# Patient Record
Sex: FEMALE | Race: ASIAN | NOT HISPANIC OR LATINO | Employment: UNEMPLOYED | URBAN - METROPOLITAN AREA
[De-identification: names, ages, dates, MRNs, and addresses within clinical notes are randomized per-mention and may not be internally consistent; named-entity substitution may affect disease eponyms.]

---

## 2021-05-13 ENCOUNTER — HISTORICAL (OUTPATIENT)
Dept: ADMINISTRATIVE | Facility: HOSPITAL | Age: 26
End: 2021-05-13

## 2021-05-13 LAB
ABS NEUT (OLG): 7.85 X10(3)/MCL (ref 2.1–9.2)
APPEARANCE, UA: CLEAR
BACTERIA #/AREA URNS AUTO: ABNORMAL /HPF
BASOPHILS # BLD AUTO: 0 X10(3)/MCL (ref 0–0.2)
BASOPHILS NFR BLD AUTO: 0 %
BILIRUB UR QL STRIP: NEGATIVE
BUN SERPL-MCNC: 5.3 MG/DL (ref 7–18.7)
CALCIUM SERPL-MCNC: 10.1 MG/DL (ref 8.4–10.2)
CHLORIDE SERPL-SCNC: 107 MMOL/L (ref 98–107)
CO2 SERPL-SCNC: 21 MMOL/L (ref 22–29)
COLOR UR: ABNORMAL
CREAT SERPL-MCNC: 0.54 MG/DL (ref 0.55–1.02)
CREAT/UREA NIT SERPL: 10
EOSINOPHIL # BLD AUTO: 0.1 X10(3)/MCL (ref 0–0.9)
EOSINOPHIL NFR BLD AUTO: 1 %
ERYTHROCYTE [DISTWIDTH] IN BLOOD BY AUTOMATED COUNT: 11.8 % (ref 11.5–14.5)
GLUCOSE (UA): NEGATIVE
GLUCOSE SERPL-MCNC: 75 MG/DL (ref 74–100)
HBV SURFACE AG SERPL QL IA: NONREACTIVE
HCT VFR BLD AUTO: 38 % (ref 35–46)
HCV AB SERPL QL IA: NONREACTIVE
HGB BLD-MCNC: 12.9 GM/DL (ref 12–16)
HGB UR QL STRIP: NEGATIVE
HIV 1+2 AB+HIV1 P24 AG SERPL QL IA: NONREACTIVE
HYALINE CASTS #/AREA URNS LPF: ABNORMAL /LPF
IMM GRANULOCYTES # BLD AUTO: 0.03 10*3/UL
IMM GRANULOCYTES NFR BLD AUTO: 0 %
KETONES UR QL STRIP: NEGATIVE
LEUKOCYTE ESTERASE UR QL STRIP: NEGATIVE
LYMPHOCYTES # BLD AUTO: 2.2 X10(3)/MCL (ref 0.6–4.6)
LYMPHOCYTES NFR BLD AUTO: 21 %
MCH RBC QN AUTO: 30.9 PG (ref 26–34)
MCHC RBC AUTO-ENTMCNC: 33.9 GM/DL (ref 31–37)
MCV RBC AUTO: 91.1 FL (ref 80–100)
MONOCYTES # BLD AUTO: 0.5 X10(3)/MCL (ref 0.1–1.3)
MONOCYTES NFR BLD AUTO: 4 %
MUCOUS THREADS URNS QL MICRO: SLIGHT
NEUTROPHILS # BLD AUTO: 7.85 X10(3)/MCL (ref 2.1–9.2)
NEUTROPHILS NFR BLD AUTO: 74 %
NITRITE UR QL STRIP: NEGATIVE
PH UR STRIP: 7 [PH] (ref 4.5–8)
PLATELET # BLD AUTO: 237 X10(3)/MCL (ref 130–400)
PMV BLD AUTO: 9.9 FL (ref 7.4–10.4)
POTASSIUM SERPL-SCNC: 3.7 MMOL/L (ref 3.5–5.1)
PROT UR QL STRIP: NEGATIVE
RBC # BLD AUTO: 4.17 X10(6)/MCL (ref 4–5.2)
RBC #/AREA URNS AUTO: ABNORMAL /HPF
SODIUM SERPL-SCNC: 139 MMOL/L (ref 136–145)
SP GR UR STRIP: 1 (ref 1–1.03)
SQUAMOUS #/AREA URNS LPF: ABNORMAL /LPF
T PALLIDUM AB SER QL: NONREACTIVE
UROBILINOGEN UR STRIP-ACNC: NORMAL
WBC # SPEC AUTO: 10.6 X10(3)/MCL (ref 4.5–11)
WBC #/AREA URNS AUTO: ABNORMAL /HPF

## 2021-05-16 LAB — FINAL CULTURE: NORMAL

## 2021-06-09 ENCOUNTER — HISTORICAL (OUTPATIENT)
Dept: ADMINISTRATIVE | Facility: HOSPITAL | Age: 26
End: 2021-06-09

## 2021-06-09 LAB
APPEARANCE, UA: ABNORMAL
BACTERIA #/AREA URNS AUTO: ABNORMAL /HPF
BILIRUB SERPL-MCNC: NEGATIVE MG/DL
BILIRUB UR QL STRIP: NEGATIVE
BLOOD URINE, POC: NEGATIVE
CLARITY, POC UA: CLEAR
COLOR UR: ABNORMAL
COLOR, POC UA: YELLOW
GLUCOSE (UA): 70 MG/DL
GLUCOSE UR QL STRIP: NORMAL
HGB UR QL STRIP: NEGATIVE
HYALINE CASTS #/AREA URNS LPF: ABNORMAL /LPF
KETONES UR QL STRIP: NEGATIVE
KETONES UR QL STRIP: NEGATIVE
LEUKOCYTE EST, POC UA: NORMAL
LEUKOCYTE ESTERASE UR QL STRIP: 500 LEU/UL
NITRITE UR QL STRIP: NEGATIVE
NITRITE, POC UA: NEGATIVE
PH UR STRIP: 7.5 [PH] (ref 4.5–8)
PH, POC UA: 7.5
PROT UR QL STRIP: NEGATIVE
PROTEIN, POC: NEGATIVE
RBC #/AREA URNS AUTO: ABNORMAL /HPF
SP GR UR STRIP: 1 (ref 1–1.03)
SPECIFIC GRAVITY, POC UA: 1.02
SQUAMOUS #/AREA URNS LPF: >100 /LPF
UROBILINOGEN UR STRIP-ACNC: NORMAL
UROBILINOGEN, POC UA: NORMAL
WBC #/AREA URNS AUTO: ABNORMAL /HPF

## 2021-06-11 LAB — FINAL CULTURE: NORMAL

## 2021-06-29 LAB
BILIRUB SERPL-MCNC: NEGATIVE MG/DL
BLOOD URINE, POC: NEGATIVE
CLARITY, POC UA: CLEAR
COLOR, POC UA: YELLOW
GLUCOSE UR QL STRIP: NEGATIVE
KETONES UR QL STRIP: NEGATIVE
LEUKOCYTE EST, POC UA: NEGATIVE
NITRITE, POC UA: NEGATIVE
PH, POC UA: 7
PROTEIN, POC: NEGATIVE
SPECIFIC GRAVITY, POC UA: 1.01
UROBILINOGEN, POC UA: NORMAL

## 2021-07-26 LAB
BILIRUB SERPL-MCNC: NEGATIVE MG/DL
BLOOD URINE, POC: NEGATIVE
CLARITY, POC UA: CLEAR
COLOR, POC UA: YELLOW
GLUCOSE UR QL STRIP: NORMAL
KETONES UR QL STRIP: NEGATIVE
LEUKOCYTE EST, POC UA: NEGATIVE
NITRITE, POC UA: NEGATIVE
PH, POC UA: 7
PROTEIN, POC: NEGATIVE
SPECIFIC GRAVITY, POC UA: 1.02
UROBILINOGEN, POC UA: NORMAL

## 2021-09-01 ENCOUNTER — HISTORICAL (OUTPATIENT)
Dept: ADMINISTRATIVE | Facility: HOSPITAL | Age: 26
End: 2021-09-01

## 2021-09-01 LAB
BILIRUB SERPL-MCNC: NEGATIVE MG/DL
BLOOD URINE, POC: NEGATIVE
CLARITY, POC UA: CLEAR
COLOR, POC UA: YELLOW
GLUCOSE 1H P 100 G GLC PO SERPL-MCNC: 182 MG/DL (ref 100–180)
GLUCOSE UR QL STRIP: NEGATIVE
HCT VFR BLD AUTO: 30.7 % (ref 35–46)
HGB BLD-MCNC: 10.8 GM/DL (ref 12–16)
KETONES UR QL STRIP: NEGATIVE
LEUKOCYTE EST, POC UA: NEGATIVE
NITRITE, POC UA: NEGATIVE
PH, POC UA: 7
PROTEIN, POC: NEGATIVE
SPECIFIC GRAVITY, POC UA: 1.02
T PALLIDUM AB SER QL: NONREACTIVE
UROBILINOGEN, POC UA: NORMAL

## 2021-09-07 ENCOUNTER — HISTORICAL (OUTPATIENT)
Dept: ADMINISTRATIVE | Facility: HOSPITAL | Age: 26
End: 2021-09-07

## 2021-09-07 LAB
GLUCOSE 1H P 100 G GLC PO SERPL-MCNC: 235 MG/DL (ref 100–180)
GLUCOSE 2H P 100 G GLC PO SERPL-MCNC: 198 MG/DL (ref 70–140)
GLUCOSE 3H P 100 G GLC PO SERPL-MCNC: 145 MG/DL (ref 70–115)
GLUCOSE P FAST SERPL-MCNC: 81 MG/DL (ref 70–115)

## 2021-09-14 LAB
BILIRUB SERPL-MCNC: NEGATIVE MG/DL
BLOOD URINE, POC: NORMAL
CLARITY, POC UA: CLEAR
COLOR, POC UA: YELLOW
GLUCOSE UR QL STRIP: NEGATIVE
KETONES UR QL STRIP: NEGATIVE
LEUKOCYTE EST, POC UA: NORMAL
NITRITE, POC UA: NEGATIVE
PH, POC UA: 7
PROTEIN, POC: NEGATIVE
SPECIFIC GRAVITY, POC UA: 1.02
UROBILINOGEN, POC UA: NORMAL

## 2021-09-21 LAB
BILIRUB SERPL-MCNC: NEGATIVE MG/DL
BLOOD URINE, POC: NEGATIVE
CLARITY, POC UA: CLEAR
COLOR, POC UA: YELLOW
GLUCOSE UR QL STRIP: NEGATIVE
KETONES UR QL STRIP: NEGATIVE
LEUKOCYTE EST, POC UA: NORMAL
NITRITE, POC UA: NEGATIVE
PH, POC UA: 7
PROTEIN, POC: NEGATIVE
SPECIFIC GRAVITY, POC UA: 1.02
UROBILINOGEN, POC UA: NORMAL

## 2021-10-12 LAB
BILIRUB SERPL-MCNC: NEGATIVE MG/DL
BLOOD URINE, POC: NEGATIVE
CLARITY, POC UA: CLEAR
COLOR, POC UA: YELLOW
GLUCOSE UR QL STRIP: NEGATIVE
KETONES UR QL STRIP: NEGATIVE
LEUKOCYTE EST, POC UA: NORMAL
NITRITE, POC UA: NEGATIVE
PH, POC UA: 7.5
PROTEIN, POC: NEGATIVE
SPECIFIC GRAVITY, POC UA: 1.01
UROBILINOGEN, POC UA: NORMAL

## 2021-10-26 LAB
BILIRUB SERPL-MCNC: NEGATIVE MG/DL
BLOOD URINE, POC: NEGATIVE
CLARITY, POC UA: CLEAR
COLOR, POC UA: YELLOW
GLUCOSE UR QL STRIP: NEGATIVE
KETONES UR QL STRIP: NEGATIVE
LEUKOCYTE EST, POC UA: NEGATIVE
NITRITE, POC UA: NEGATIVE
PH, POC UA: 7
PROTEIN, POC: NEGATIVE
SPECIFIC GRAVITY, POC UA: 1.02
UROBILINOGEN, POC UA: NORMAL

## 2021-11-04 ENCOUNTER — HISTORICAL (OUTPATIENT)
Dept: ADMINISTRATIVE | Facility: HOSPITAL | Age: 26
End: 2021-11-04

## 2021-11-04 LAB
ABS NEUT (OLG): 4.87 X10(3)/MCL (ref 2.1–9.2)
BASOPHILS # BLD AUTO: 0 X10(3)/MCL (ref 0–0.2)
BASOPHILS NFR BLD AUTO: 0 %
BILIRUB SERPL-MCNC: NEGATIVE MG/DL
BLOOD URINE, POC: NEGATIVE
CLARITY, POC UA: CLEAR
COLOR, POC UA: YELLOW
EOSINOPHIL # BLD AUTO: 0 X10(3)/MCL (ref 0–0.9)
EOSINOPHIL NFR BLD AUTO: 1 %
ERYTHROCYTE [DISTWIDTH] IN BLOOD BY AUTOMATED COUNT: 13.5 % (ref 11.5–14.5)
GLUCOSE UR QL STRIP: NEGATIVE
HCT VFR BLD AUTO: 33.8 % (ref 35–46)
HGB BLD-MCNC: 11.5 GM/DL (ref 12–16)
HIV 1+2 AB+HIV1 P24 AG SERPL QL IA: NONREACTIVE
IMM GRANULOCYTES # BLD AUTO: 0.1 10*3/UL
IMM GRANULOCYTES NFR BLD AUTO: 1 %
KETONES UR QL STRIP: NEGATIVE
LEUKOCYTE EST, POC UA: NEGATIVE
LYMPHOCYTES # BLD AUTO: 2.1 X10(3)/MCL (ref 0.6–4.6)
LYMPHOCYTES NFR BLD AUTO: 28 %
MCH RBC QN AUTO: 32.8 PG (ref 26–34)
MCHC RBC AUTO-ENTMCNC: 34 GM/DL (ref 31–37)
MCV RBC AUTO: 96.3 FL (ref 80–100)
MONOCYTES # BLD AUTO: 0.4 X10(3)/MCL (ref 0.1–1.3)
MONOCYTES NFR BLD AUTO: 6 %
NEUTROPHILS # BLD AUTO: 4.87 X10(3)/MCL (ref 2.1–9.2)
NEUTROPHILS NFR BLD AUTO: 64 %
NITRITE, POC UA: NEGATIVE
NRBC BLD AUTO-RTO: 0 % (ref 0–0.2)
PH, POC UA: 7
PLATELET # BLD AUTO: 185 X10(3)/MCL (ref 130–400)
PMV BLD AUTO: 9.6 FL (ref 7.4–10.4)
PROTEIN, POC: NEGATIVE
RBC # BLD AUTO: 3.51 X10(6)/MCL (ref 4–5.2)
SPECIFIC GRAVITY, POC UA: 1.01
T PALLIDUM AB SER QL: NONREACTIVE
UROBILINOGEN, POC UA: NORMAL
WBC # SPEC AUTO: 7.6 X10(3)/MCL (ref 4.5–11)

## 2021-11-11 LAB
BILIRUB SERPL-MCNC: NEGATIVE MG/DL
BLOOD URINE, POC: NEGATIVE
CLARITY, POC UA: CLEAR
COLOR, POC UA: NORMAL
GLUCOSE UR QL STRIP: NEGATIVE
KETONES UR QL STRIP: NEGATIVE
LEUKOCYTE EST, POC UA: NEGATIVE
NITRITE, POC UA: NEGATIVE
PH, POC UA: 7
PROTEIN, POC: NEGATIVE
SPECIFIC GRAVITY, POC UA: 1.01
UROBILINOGEN, POC UA: NORMAL

## 2022-01-06 LAB
HUMAN PAPILLOMAVIRUS (HPV): NORMAL
PAP RECOMMENDATION EXT: ABNORMAL
PAP SMEAR: ABNORMAL

## 2022-01-07 ENCOUNTER — HISTORICAL (OUTPATIENT)
Dept: FAMILY MEDICINE | Facility: CLINIC | Age: 27
End: 2022-01-07

## 2022-01-07 LAB
GLUCOSE 1H P 100 G GLC PO SERPL-MCNC: 149 MG/DL (ref 100–180)
GLUCOSE 2H P 100 G GLC PO SERPL-MCNC: 85 MG/DL (ref 70–140)
GLUCOSE P FAST SERPL-MCNC: 95 MG/DL (ref 70–115)

## 2022-04-07 ENCOUNTER — HISTORICAL (OUTPATIENT)
Dept: ADMINISTRATIVE | Facility: HOSPITAL | Age: 27
End: 2022-04-07

## 2022-04-24 VITALS
BODY MASS INDEX: 18.86 KG/M2 | OXYGEN SATURATION: 98 % | WEIGHT: 99.88 LBS | DIASTOLIC BLOOD PRESSURE: 78 MMHG | SYSTOLIC BLOOD PRESSURE: 111 MMHG | HEIGHT: 61 IN

## 2022-09-22 ENCOUNTER — HISTORICAL (OUTPATIENT)
Dept: ADMINISTRATIVE | Facility: HOSPITAL | Age: 27
End: 2022-09-22

## 2023-04-26 ENCOUNTER — DOCUMENTATION ONLY (OUTPATIENT)
Dept: ADMINISTRATIVE | Facility: HOSPITAL | Age: 28
End: 2023-04-26

## 2023-06-27 ENCOUNTER — OFFICE VISIT (OUTPATIENT)
Dept: FAMILY MEDICINE | Facility: CLINIC | Age: 28
End: 2023-06-27
Payer: MEDICAID

## 2023-06-27 VITALS
RESPIRATION RATE: 17 BRPM | BODY MASS INDEX: 18.5 KG/M2 | OXYGEN SATURATION: 100 % | SYSTOLIC BLOOD PRESSURE: 110 MMHG | HEIGHT: 61 IN | TEMPERATURE: 98 F | WEIGHT: 98 LBS | DIASTOLIC BLOOD PRESSURE: 68 MMHG | HEART RATE: 66 BPM

## 2023-06-27 DIAGNOSIS — Z34.90 PREGNANCY, UNSPECIFIED GESTATIONAL AGE: Primary | ICD-10-CM

## 2023-06-27 DIAGNOSIS — K21.9 GASTROESOPHAGEAL REFLUX DISEASE, UNSPECIFIED WHETHER ESOPHAGITIS PRESENT: ICD-10-CM

## 2023-06-27 PROBLEM — R87.612 LOW GRADE SQUAMOUS INTRAEPITHELIAL LESION (LGSIL) ON PAPANICOLAOU SMEAR OF CERVIX: Status: ACTIVE | Noted: 2023-06-27

## 2023-06-27 PROBLEM — Z78.9 NOT IMMUNE TO RUBELLA: Status: ACTIVE | Noted: 2023-06-27

## 2023-06-27 PROBLEM — O24.419 GESTATIONAL DIABETES MELLITUS (GDM): Status: ACTIVE | Noted: 2023-06-27

## 2023-06-27 PROCEDURE — 99214 OFFICE O/P EST MOD 30 MIN: CPT | Mod: PBBFAC

## 2023-06-27 RX ORDER — DOXYLAMINE SUCCINATE AND PYRIDOXINE HYDROCHLORIDE, DELAYED RELEASE TABLETS 10 MG/10 MG 10; 10 MG/1; MG/1
2 TABLET, DELAYED RELEASE ORAL NIGHTLY
Qty: 60 TABLET | Refills: 2 | Status: SHIPPED | OUTPATIENT
Start: 2023-06-27 | End: 2023-06-27

## 2023-06-27 RX ORDER — CALC/MAG/B COMPLEX/D3/HERB 61
15 TABLET ORAL DAILY
Qty: 30 CAPSULE | Refills: 11 | Status: SHIPPED | OUTPATIENT
Start: 2023-06-27 | End: 2023-08-03 | Stop reason: SDUPTHER

## 2023-06-27 NOTE — PROGRESS NOTES
"[facility] Walk In Progress Note    ID:  Bennie Escamilla   MRN:  [id number]     2023    Chief Complaint:      History of Present Illness:  Bennie Escamilla is a 28 y.o.  who presents to [facility] clinic for verify pregnancy, lmp 23 with pos home pregnancy test.    G1: 2021- vaginal 39.6 wga- GDM  G2: Current    Menstrual Hx:  Onset 13, 28 days, 3-5 pads, 3-5 days    Acute Issues:  "Stomach burning"- intermittent 2 weeks upper abdomen worse with lying flat or spicy foods. Not taking any meds.     Chronic Issues:  Hx of LSIL on PAP  Gestational DM- diet controlled      Health Maintenance   Topic Date Due    Lipid Panel  Never done    TETANUS VACCINE  Never done    Pap Smear  2025    Hepatitis C Screening  Completed       No past medical history on file.    No past surgical history on file.    Social History     Tobacco Use    Smoking status: Never    Smokeless tobacco: Never       No family history on file.      Current Outpatient Medications:     doxylamine-pyridoxine, vit B6, (DICLEGIS) 10-10 mg TbEC, Take 2 tablets by mouth every evening., Disp: 60 tablet, Rfl: 2    Review of patient's allergies indicates:  No Known Allergies      Review of Systems:  See HPI      Physical Exam:  /68 (BP Location: Right arm, Patient Position: Sitting, BP Method: Medium (Automatic))   Pulse 66   Temp 97.7 °F (36.5 °C) (Oral)   Resp 17   Ht 5' 1.42" (1.56 m)   Wt 44.5 kg (98 lb)   LMP 2023 (Exact Date)   SpO2 100%   BMI 18.27 kg/m²     General: in no acute distress  RESP: clear to auscultation bilaterally, non labored  CV: regular rate and rhythm, no murmurs, no edema  ABD: gravid, nontender, BS+        Assessment/Plan:    Pregnancy, unspecified gestational age  Gastroesophageal reflux disease, unspecified whether esophagitis present      Plan:  - UPT positive  - OB Protocol, PNVs to pharmacy  - Prevacid for GERD, discussed dietary modifications  - Postpartum contraception discussion: pending  - Labor " precautions discussed  - Return to clinic with initial OB clinic for initial labs      Kee Trimble MD  [employer] FM Resident HO[facility]

## 2023-06-28 NOTE — PROGRESS NOTES
Faculty Attestation: Bennie Escamilla  was seen in Family Medicine Clinic. Patient chart reviewed. History of Present Illness, Physical Exam, and Assessment and Plan reviewed. Treatment plan is reasonable and appropriate. Compliance with treatment recommendations is important.      Phi Johnston MD

## 2023-07-02 ENCOUNTER — HOSPITAL ENCOUNTER (EMERGENCY)
Facility: HOSPITAL | Age: 28
Discharge: HOME | End: 2023-07-02
Attending: EMERGENCY MEDICINE
Payer: MEDICAID

## 2023-07-02 VITALS
HEIGHT: 61 IN | HEART RATE: 78 BPM | RESPIRATION RATE: 18 BRPM | SYSTOLIC BLOOD PRESSURE: 103 MMHG | TEMPERATURE: 99 F | WEIGHT: 98 LBS | DIASTOLIC BLOOD PRESSURE: 75 MMHG | OXYGEN SATURATION: 100 % | BODY MASS INDEX: 18.5 KG/M2

## 2023-07-02 DIAGNOSIS — Z34.91 NORMAL INTRAUTERINE PREGNANCY ON PRENATAL ULTRASOUND IN FIRST TRIMESTER: Primary | ICD-10-CM

## 2023-07-02 DIAGNOSIS — O20.9 VAGINAL BLEEDING AFFECTING EARLY PREGNANCY: ICD-10-CM

## 2023-07-02 LAB
AMORPH URATE CRY URNS QL MICRO: ABNORMAL /HPF
APPEARANCE UR: ABNORMAL
B-HCG FREE SERPL-ACNC: ABNORMAL MIU/ML
B-HCG SERPL QL: POSITIVE
BACTERIA #/AREA URNS AUTO: ABNORMAL /HPF
BILIRUB UR QL STRIP.AUTO: NEGATIVE MG/DL
C TRACH DNA SPEC QL NAA+PROBE: NOT DETECTED
COLOR UR: YELLOW
GLUCOSE UR QL STRIP.AUTO: NEGATIVE MG/DL
KETONES UR QL STRIP.AUTO: NEGATIVE MG/DL
LEUKOCYTE ESTERASE UR QL STRIP.AUTO: ABNORMAL UNIT/L
N GONORRHOEA DNA SPEC QL NAA+PROBE: NOT DETECTED
NITRITE UR QL STRIP.AUTO: NEGATIVE
PH UR STRIP.AUTO: 7 [PH]
PROT UR QL STRIP.AUTO: ABNORMAL MG/DL
RBC #/AREA URNS AUTO: ABNORMAL /HPF
RBC UR QL AUTO: ABNORMAL UNIT/L
SP GR UR STRIP.AUTO: 1.01
SQUAMOUS #/AREA URNS AUTO: ABNORMAL /HPF
UROBILINOGEN UR STRIP-ACNC: 0.2 MG/DL
WBC #/AREA URNS AUTO: ABNORMAL /HPF

## 2023-07-02 PROCEDURE — 99284 EMERGENCY DEPT VISIT MOD MDM: CPT | Mod: 25

## 2023-07-02 PROCEDURE — 84702 CHORIONIC GONADOTROPIN TEST: CPT | Performed by: EMERGENCY MEDICINE

## 2023-07-02 PROCEDURE — 81001 URINALYSIS AUTO W/SCOPE: CPT | Performed by: EMERGENCY MEDICINE

## 2023-07-02 PROCEDURE — 87088 URINE BACTERIA CULTURE: CPT | Performed by: EMERGENCY MEDICINE

## 2023-07-02 PROCEDURE — 81025 URINE PREGNANCY TEST: CPT | Performed by: EMERGENCY MEDICINE

## 2023-07-02 PROCEDURE — 87591 N.GONORRHOEAE DNA AMP PROB: CPT | Performed by: EMERGENCY MEDICINE

## 2023-07-02 NOTE — ED TRIAGE NOTES
Pt relates that she is pregnant approx 2 months with lmp 4/26/23. Pt complaint of a dark color vaginal discharge this morning.  Pt denies pain. Pt has no OB_GYN MD

## 2023-07-04 LAB — BACTERIA UR CULT: ABNORMAL

## 2023-08-03 ENCOUNTER — HOSPITAL ENCOUNTER (OUTPATIENT)
Dept: RADIOLOGY | Facility: HOSPITAL | Age: 28
Discharge: HOME | End: 2023-08-03
Attending: OBSTETRICS & GYNECOLOGY | Admitting: OBSTETRICS & GYNECOLOGY
Payer: MEDICAID

## 2023-08-03 ENCOUNTER — OFFICE VISIT (OUTPATIENT)
Dept: FAMILY MEDICINE | Facility: CLINIC | Age: 28
End: 2023-08-03
Payer: MEDICAID

## 2023-08-03 VITALS
SYSTOLIC BLOOD PRESSURE: 92 MMHG | BODY MASS INDEX: 18.2 KG/M2 | WEIGHT: 96.38 LBS | DIASTOLIC BLOOD PRESSURE: 60 MMHG | RESPIRATION RATE: 20 BRPM | HEART RATE: 77 BPM | OXYGEN SATURATION: 99 % | TEMPERATURE: 99 F | HEIGHT: 61 IN

## 2023-08-03 DIAGNOSIS — O21.9 NAUSEA/VOMITING IN PREGNANCY: ICD-10-CM

## 2023-08-03 DIAGNOSIS — Z3A.13 13 WEEKS GESTATION OF PREGNANCY: ICD-10-CM

## 2023-08-03 DIAGNOSIS — Z34.90 PREGNANCY: ICD-10-CM

## 2023-08-03 DIAGNOSIS — K21.9 GASTROESOPHAGEAL REFLUX DISEASE, UNSPECIFIED WHETHER ESOPHAGITIS PRESENT: ICD-10-CM

## 2023-08-03 DIAGNOSIS — Z34.90 PREGNANCY, UNSPECIFIED GESTATIONAL AGE: ICD-10-CM

## 2023-08-03 DIAGNOSIS — Z3A.12 12 WEEKS GESTATION OF PREGNANCY: Primary | ICD-10-CM

## 2023-08-03 LAB
APPEARANCE UR: ABNORMAL
BACTERIA #/AREA URNS AUTO: ABNORMAL /HPF
BASOPHILS # BLD AUTO: 0.04 X10(3)/MCL
BASOPHILS NFR BLD AUTO: 0.4 %
BILIRUB SERPL-MCNC: NORMAL MG/DL
BILIRUB UR QL STRIP.AUTO: NEGATIVE
BLOOD URINE, POC: NORMAL
C TRACH DNA SPEC QL NAA+PROBE: NOT DETECTED
CLARITY, POC UA: CLEAR
COLOR UR: YELLOW
COLOR, POC UA: YELLOW
EOSINOPHIL # BLD AUTO: 0.07 X10(3)/MCL (ref 0–0.9)
EOSINOPHIL NFR BLD AUTO: 0.7 %
ERYTHROCYTE [DISTWIDTH] IN BLOOD BY AUTOMATED COUNT: 11.9 % (ref 11.5–17)
GLUCOSE UR QL STRIP.AUTO: NORMAL
GLUCOSE UR QL STRIP: NORMAL
GROUP & RH: NORMAL
HBV SURFACE AG SERPL QL IA: NONREACTIVE
HCT VFR BLD AUTO: 37.2 % (ref 37–47)
HCV AB SERPL QL IA: NONREACTIVE
HGB BLD-MCNC: 12.7 G/DL (ref 12–16)
HIV 1+2 AB+HIV1 P24 AG SERPL QL IA: NONREACTIVE
HYALINE CASTS #/AREA URNS LPF: ABNORMAL /LPF
IMM GRANULOCYTES # BLD AUTO: 0.04 X10(3)/MCL (ref 0–0.04)
IMM GRANULOCYTES NFR BLD AUTO: 0.4 %
INDIRECT COOMBS GEL: NORMAL
KETONES UR QL STRIP.AUTO: NEGATIVE
KETONES UR QL STRIP: NORMAL
LEUKOCYTE ESTERASE UR QL STRIP.AUTO: 25
LEUKOCYTE ESTERASE URINE, POC: NORMAL
LYMPHOCYTES # BLD AUTO: 2.58 X10(3)/MCL (ref 0.6–4.6)
LYMPHOCYTES NFR BLD AUTO: 25.7 %
MCH RBC QN AUTO: 30.8 PG (ref 27–31)
MCHC RBC AUTO-ENTMCNC: 34.1 G/DL (ref 33–36)
MCV RBC AUTO: 90.3 FL (ref 80–94)
MONOCYTES # BLD AUTO: 0.38 X10(3)/MCL (ref 0.1–1.3)
MONOCYTES NFR BLD AUTO: 3.8 %
N GONORRHOEA DNA SPEC QL NAA+PROBE: NOT DETECTED
NEUTROPHILS # BLD AUTO: 6.92 X10(3)/MCL (ref 2.1–9.2)
NEUTROPHILS NFR BLD AUTO: 69 %
NITRITE UR QL STRIP.AUTO: NEGATIVE
NITRITE, POC UA: NORMAL
NRBC BLD AUTO-RTO: 0 %
PH UR STRIP.AUTO: 7.5 [PH]
PH, POC UA: 7.5
PLATELET # BLD AUTO: 228 X10(3)/MCL (ref 130–400)
PMV BLD AUTO: 9.3 FL (ref 7.4–10.4)
PROT UR QL STRIP.AUTO: ABNORMAL
PROTEIN, POC: NORMAL
RBC # BLD AUTO: 4.12 X10(6)/MCL (ref 4.2–5.4)
RBC #/AREA URNS AUTO: ABNORMAL /HPF
RBC UR QL AUTO: NEGATIVE
SOURCE (OHS): NORMAL
SP GR UR STRIP.AUTO: 1.02
SPECIFIC GRAVITY, POC UA: 1.02
SPECIMEN OUTDATE: NORMAL
SQUAMOUS #/AREA URNS LPF: ABNORMAL /HPF
T PALLIDUM AB SER QL: NONREACTIVE
UROBILINOGEN UR STRIP-ACNC: NORMAL
UROBILINOGEN, POC UA: 0.2
WBC # SPEC AUTO: 10.03 X10(3)/MCL (ref 4.5–11.5)
WBC #/AREA URNS AUTO: ABNORMAL /HPF
YEAST BUDDING URNS QL: ABNORMAL /HPF

## 2023-08-03 PROCEDURE — 85660 RBC SICKLE CELL TEST: CPT

## 2023-08-03 PROCEDURE — 81001 URINALYSIS AUTO W/SCOPE: CPT

## 2023-08-03 PROCEDURE — 88174 CYTOPATH C/V AUTO IN FLUID: CPT

## 2023-08-03 PROCEDURE — 81002 URINALYSIS NONAUTO W/O SCOPE: CPT | Mod: PBBFAC

## 2023-08-03 PROCEDURE — 99214 OFFICE O/P EST MOD 30 MIN: CPT | Mod: PBBFAC,25

## 2023-08-03 PROCEDURE — 76801 OB US < 14 WKS SINGLE FETUS: CPT | Mod: TC

## 2023-08-03 PROCEDURE — 86803 HEPATITIS C AB TEST: CPT

## 2023-08-03 PROCEDURE — 87088 URINE BACTERIA CULTURE: CPT

## 2023-08-03 PROCEDURE — 86787 VARICELLA-ZOSTER ANTIBODY: CPT

## 2023-08-03 PROCEDURE — 36415 COLL VENOUS BLD VENIPUNCTURE: CPT

## 2023-08-03 PROCEDURE — 87591 N.GONORRHOEAE DNA AMP PROB: CPT

## 2023-08-03 PROCEDURE — 86762 RUBELLA ANTIBODY: CPT

## 2023-08-03 PROCEDURE — 86780 TREPONEMA PALLIDUM: CPT

## 2023-08-03 PROCEDURE — 87340 HEPATITIS B SURFACE AG IA: CPT

## 2023-08-03 PROCEDURE — 85025 COMPLETE CBC W/AUTO DIFF WBC: CPT

## 2023-08-03 PROCEDURE — 87389 HIV-1 AG W/HIV-1&-2 AB AG IA: CPT

## 2023-08-03 PROCEDURE — 86900 BLOOD TYPING SEROLOGIC ABO: CPT

## 2023-08-03 RX ORDER — CALC/MAG/B COMPLEX/D3/HERB 61
15 TABLET ORAL DAILY
Qty: 30 CAPSULE | Refills: 11 | Status: SHIPPED | OUTPATIENT
Start: 2023-08-03 | End: 2023-08-29 | Stop reason: SDUPTHER

## 2023-08-03 RX ORDER — PROMETHAZINE HYDROCHLORIDE 25 MG/1
25 SUPPOSITORY RECTAL EVERY 6 HOURS PRN
Qty: 12 SUPPOSITORY | Refills: 1 | Status: SHIPPED | OUTPATIENT
Start: 2023-08-03 | End: 2023-11-14

## 2023-08-03 NOTE — PATIENT INSTRUCTIONS
Well Child Exam    About this topic  A well child exam is a visit with your child's doctor to check your child's health. The doctor will check your child's growth, progress, and shot record. It is also a time for you to ask your child's doctor any questions you have about your child's health. Your child will have a full exam during the office visit. Other things that are sometimes checked are hearing, eyesight, and urine or blood tests. The doctor may give shots during your child's well visit.    General    Getting Ready for a Well Child Exam    A well child exam is a good time for you to talk with your child's doctor about any of these topics:    Eating habits or diet    How your child acts    Sleep issues    Growth    Safety    Vaccines    Toilet training    Teen years    How your child is doing in school or any learning concerns    Home life    You may want to make a written list of the things you want to talk about with your child's doctor. Be sure to bring your list of questions to your child's well visit. You may also want to do some research on your own before your office visit by reading books or looking at Web sites. Other family members, child caregivers, and grandparents may be able to help you too. Your child's doctor may ask also you about your family's health history or if your child is around anyone who smokes.    The Exam    The doctor measures your child's weight, height, and sometimes head size or body mass index (BMI). The doctor plots these numbers on a growth curve. The growth curve gives a picture of your baby's growth at each visit. The doctor may check your child's temperature, blood pressure, breathing, and heart rate. The doctor may listen to your child's heart, lungs, and belly. Your doctor will do a full exam of your child from the head to the toes.    Growth and Development Questions    Your doctor will ask you about your child's progress. The doctor will focus on the skills that are  likely to happen at your child's age. Some of these are motor skills like rolling over, walking, and running, while others are social skills, or how your child interacts with other people. Your child's doctor will also ask you how your child is doing in school.    Help for Parents    Your doctor will talk with you about any concerns you have about your child during this visit. The doctor may also talk with you about:    Getting family help or other support    Ways to help your child's brain growth    How your child plays and acts with others    Ways to help your child exercise    Safety    Eating habits    Vaccines    Quitting smoking    Help if you have a low mood after having a baby    Shots or Vaccines    It is important for your child to get shots on time. This protects from very serious illnesses like pertussis, measles, or some kinds of pneumonia. Sometimes, your child may need more than one dose of vaccine. The vaccines used today are safer than ever. Talk to your doctor if you have any questions or concerns about giving your child vaccines.    Well Child Exam Schedule    The American Academy of Pediatrics (AAP) suggests this plan for well child visits:    Erin (3 to 5 days old)    1 month old    2 months old    4 months old    6 months old    9 months old    12 months old    15 months old    18 months old    2 years old    30 months old    3 years old    4 years old    Once each year until age 21    Well child exams are very important. Since your child is healthy at this visit and it is scheduled ahead of time, you can think about things you want to ask your child's doctor. Be sure to follow the above plan for well child visits as well as any other visits your child's doctor suggests.    Where can I learn more?    Centers for Disease Control and Prevention    http://www.cdc.gov/vaccines     Healthy  Children    https://www.healthychildren.org/English/family-life/health-management/Pages/Well-Child-Care-A-Check-Up-for-Success.aspx    Disclaimer.  This generalized information is a limited summary of diagnosis, treatment, and/or medication information. It is not meant to be comprehensive and should be used as a tool to help the user understand and/or assess potential diagnostic and treatment options. It does NOT include all information about conditions, treatments, medications, side effects, or risks that may apply to a specific patient. It is not intended to be medical advice or a substitute for the medical advice, diagnosis, or treatment of a health care provider based on the health care provider's examination and assessment of a patients specific and unique circumstances. Patients must speak with a health care provider for complete information about their health, medical questions, and treatment options, including any risks or benefits regarding use of medications. This information does not endorse any treatments or medications as safe, effective, or approved for treating a specific patient. UpToDate, Inc[employer] and its affiliates disclaim any warranty or liability relating to this information or the use thereof. The use of this information is governed by the Terms of Use, available at Terms of Use. ©2022 UpToDate, Inc. and its affiliates and/or licensors. All rights reserved.

## 2023-08-03 NOTE — PROGRESS NOTES
[facility] Note    Subjective:     Chief Complaint:   Chief Complaint   Patient presents with    Initial Prenatal Visit     HPI  29 yo  @ 12^1 WGA by 1st trimester US (PATRICE: 2024) presents for initial prenatal visit.    Acute Concerns  Having vomiting multiple times per day. Previously prescribed Vit B6 and Prevacid. She had no improvement with b6 and was unable to start Prevacid due to issues with pharmacy receiving prescription. No other complaints today. Not yet feeling fetal movements.     Chronic Conditions  - Hx of gestational DM: diet controlled    OB/GYN History  OB History    Para Term  AB Living   2 1 1 0 0 1   SAB IAB Ectopic Multiple Live Births   0 0 0 0 1      # Outcome Date GA Lbr Dedrick/2nd Weight Sex Delivery Anes PTL Lv   2 Current            1 Term 21   3.062 kg (6 lb 12 oz) F Vag-Spont  N NEHEMIAS      GYN Hx:  - LMP: 23 (exact date)  - Menarche: 12 years old  - Menstrual Hx: 28 day intervals  - Hx of birth control: none  - Hx of STDs: denies  - History of Abnormal PAP: LSIL     Antepartum Review of Systems  Fetal movements: yes  Vaginal bleeding: no  Vaginal discharge: no  Loss of fluid: no  Contractions: no  Headaches: no  Vision changes: no  Edema: no    Objective:     Vitals:    23 0943   BP: 92/60   Pulse: 77   Resp: 20   Temp: 98.6 °F (37 °C)     Physical Exam    General: Well developed, no acute distress  CV: RRR, no murmurs, no edema, 2+ peripheral pulses  Resp: CTAB, non labored breathing on room air  Abd: gravid, non-tender, +BS    FHT: 163 on US completed today  Speculum exam: External genitalia without lesions or erythema. Vaginal mucosa normal appearing without lesions. Cervix closed with no lesions or masses.     Initial OB Labs ordered 8/3/23  - Blood Type and Rh:   - Antibody Screen:   - CBC H/H:   - HIV:   - RPR:   - GC:   - CT:  - HBsAg:   - HCVAb:   - Rubella:   - Varicella:   - UA & Culture:   - Sickle Cell Screen:   -  PAP:   - Influenza vaccine date: out of season    15-20 Weeks Lab   - Quad Screen:     28 Week Lab   - 1H GTT:   - Rhogam:   - Date of Tdap:   - CBC H/H:   - RPR:   - BTL Consent:     36 Week Lab  - CBC H/H:   - RPR:   - GBS Culture:   - HIV:   - Urine: GC:     Urine Dipstick  Component 09:47   Color, UA Yellow    pH, UA 7.5    WBC, UA trace    Nitrite, UA neg    Protein, POC neg    Glucose, UA neg    Ketones, UA neg    Urobilinogen, UA 0.2    Bilirubin, POC neg    Blood, UA neg    Clarity, UA Clear    Spec Grav UA 1.020      Ultrasound  Initial US  Single live intrauterine gestation with estimated gestational age of 7 weeks and 4 days by biometry. Recommend full fetal survey at 18-20 weeks.    20 wk anatomy US  Not yet completed    Assessment/Plan:       12 weeks gestation of pregnancy  - OB Protocol   - PNVs  - Urine dip reviewed as above  - Initial labs pending  - Mother plans to breast and/or bottlefeed  - Postpartum contraception discussion: undecided  - Labor/ED precautions discussed    Nausea/vomiting during pregnancy  - Failed Vit B6 therapy  - Prevacid and PRN Phenergan suppositories    Follow-up: 4 weeks in [facility]    Rosalino Menchaca MD  [employer] [facility] - PGY-II

## 2023-08-04 LAB
HGB S BLD QL SOLY: NEGATIVE
RUBV IGG SERPL IA-ACNC: 4.3
RUBV IGG SERPL QL IA: POSITIVE
VZV IGG SER IA-ACNC: 1.6
VZV IGG SER QL IA: POSITIVE

## 2023-08-05 LAB — BACTERIA UR CULT: ABNORMAL

## 2023-08-07 ENCOUNTER — PATIENT OUTREACH (OUTPATIENT)
Dept: FAMILY MEDICINE | Facility: CLINIC | Age: 28
End: 2023-08-07
Payer: MEDICAID

## 2023-08-07 NOTE — PROGRESS NOTES
I have personally reviewed the review of systems (ROS) and past, family and social histories (PFSH) documented above by the resident.  I have reviewed the care furnished by the resident during the encounter, including a review of the patient's medical history, the resident's findings on physical examination, diagnosis, and the treatment plan.  I participated in the management of the patient and was immediately available throughout the encounter.   I was physically present during all key portions of the service(s) provided with the resident.  Services were furnished in a primary care center located in the outpatient department of a [facility].

## 2023-08-07 NOTE — PROGRESS NOTES
SDOH Questionnaire  Which of the following best describes your current living situation? (Select ONE only)  [] Live alone in my own home (house, apartment, condo, trailer, etc.)   [x] Live in a household with other people (roommates, family, friends.)  [] Live in a residential facility where meals/ household help are routinely provided by paid staff (or could be if requested)  [] Live in a facility such as a nursing home which provides meals and 24-hour nursing care  [] Temporarily staying with a relative or friend  [] Temporarily staying in a shelter or homeless  [] Other:       Do you have any concerns about your current living situation, like housing conditions, safety, and costs?  []Condition of housing []Lack of more permanent housing []Ability to pay for housing or utilities    []Feeling safe  [x] No concerns    []Other:     In the past 3 months, did you have trouble paying for any of the following? (Select ALL that apply)  []Food   []Housing []Heat and electricity []Medical needs  []Transportation  []Childcare  [x]None of these  []Other:     In the past 3 months, how often have you worried that your food would run out before you had money to buy more?   [x]Never  []Sometimes []Often  []Very often  Has lack of transportation kept you from medical appointments or doing daily living tasks? (Select ALL that apply)  [] Kept me from medical appointments or from getting medications  [] Kept me from doing things needed for daily living  [x] Not a problem for me    In the last month, how often have you felt difficulties were piling up so high that you could not overcome them?  [x]Never  []Almost never []Sometimes []Fairly often []Very often    Which of the following would you like to receive help with at this time? (Select ALL that apply)  []Food      []Activities of daily living            []Housing     []Childcare/other child-related issues  []Transportation     []Applying for public benefits (SSI, Medicaid,  SNAP)  []Utilities (heat, electricity, water, etc.)  []Legal issues  []Medical care, medicine, medical supplies  []Employment  []Dental services     []Other   []Vision services     []None of the above     Who answered these questions?                 MY        Patient alone        Patient with help     Family member, friend, or caregiver of patient        Follow-up: 9/5/2023    Appointment reminder given for Patient does not wish to receive appointment reminders.        Patient verbalized understanding. Yes      Other comments: Spoke with patient via [employer]  Lori [id number].  Identity verified.  Patient denies abdominal pain, vaginal bleeding or leaking fluid, CP, SOB, edema, HA, visual changes, contractions.  Instructed patient if she has any of the above symptoms to present to the ED for evaluation.  Patient requested the results of her lab work.  Instructed to contact [facility] for the blood work results.  Educated on eating a healthy diet, drinking plenty of water, keeping appointments and when/where to get medical care, prenatal care.   Patient states she has not had a dental appointment in the last year.  Educated on the benefits of oral cleanings every 6 months.  Offered to mail [facility] oral health education/resources, patient declined.   Pt states she has not picked up the prescriptions that were ordered by the OB doctor.  She states she will  today.  Instructed to take as prescribed. Pt denies any anxiety, depression or mental health issues. Patient denies any [facility] barriers at this time and states she feels safe in her home.  Stressed the importance of medication compliance, keeping appointments and instructed on the use of urgent care clinic for non emergent issues when PCP unavailable.  Patient verbalized understanding to all instructions.        [facility] OB 8/29/2023 [id number]                      Education given on See above.

## 2023-08-07 NOTE — PATIENT INSTRUCTIONS
If you have any questions call Kayley [phone].           Why Should I Have My Own Doctor or Nurse Practitioner (PCP) to Take Care of Me  What is a PCP (Primary Care Provider)?    A primary care provider is a doctor or nurse practitioner who you can call for an appointment and will see you when you are sick.    You will also be seen at scheduled appointment times during the year to check on your diabetes, or high blood pressure, or heart disease.    Why see the same PCP (doctor/nurse practitioner)?    You can be seen faster when you are sick           You, the PCP (doctor/nurse practitioner) and the office staff get to know each other; you begin to trust them to care for you. You take part in your health choices.   All of you together are a team.    Your medicine is looked at every time you visit, to be sure you are taking the medicine, as the PCP (doctor/nurse practitioner) ordered.    Your PCP (doctor/nurse practitioner) and their staff help keep you healthy and out of the hospital.  They can catch sicknesses earlier by ordering tests once a year to stop or prevent the sickness from getting worse.      Your PCP (doctor/nurse practitioner) can send you to providers who specialize (heart/bone/lung) if you need.  They and their office staff help keep track of your seeing other providers (doctors/nurse practitioners) and tests (CT/ MRIs/ X Rays)) taken.        PCPs want you to stay healthy.  Let us care for you.                       What Do I Do If I Wake Up Sick                                                     If you wake up sick, or you start to fill sick during the day, try these tips to get care and start to feel better soon.                                                                                                                              As soon as you start to feel bad, call your doctor's office and ask for same day or next day visit appointment.        If you cannot be seen with your doctor's  office within 24 hours, you can go to an urgent care and be seen.          How to stay well:     Take your medicine as ordered by your doctor      Fill your Medicine before you run out      Exercise       Enjoy walking in the sunlight daily  Keep your scheduled clinic appointments      Keep you scheduled yearly wellness visits                           Budget-Friendly Healthy Eating    Save money at the grocery store and eat healthy.   Buy groceries keeping your budget in mind  Make a grocery list and only buy what you have on the list  Eat food you cook or have at home; limit fast food or eating out    Compare food labels  Look at store brand food labels and compare to brand names, often food value is the same and the store brand is cheaper      Look for products that do not have sugar, fat, or salt (sodium) added.  These often cost the same but are healthier for you.  They may be labeled as:  ?Sugar-free.  ?Nonfat.              ?Low-fat.  ?Sodium-free.  ?Low sodium.  Look for lean ground beef labeled as at least 92% lean and 8% fat.        Shopping    Buy only the items on your grocery list and go only to the areas of the store that have the items on your list.  Use coupons only for foods and brands you normally buy. Avoid buying items you wouldn't normally buy simply because they are on sale.  Check online and in newspapers for weekly deals.  Buy healthy items from the bulk bins when available, such as herbs, spices, flour, pasta, nuts, and dried fruit.  Buy fruits and vegetables that are in season. Prices are usually lower on in-season produce.  Look at the unit price on the price tag. Use it to compare different brands and sizes to find out which item is the best deal.  Choose healthy items that are often low-cost, such as carrots, potatoes, apples, bananas, and oranges. Dried or canned beans are a low-cost protein source.      Buy in bulk and freeze extra food. Items you can buy in bulk include meats, fish,  "poultry, frozen fruits, and frozen vegetables.  Avoid buying "ready-to-eat" foods, such as pre-cut fruits and vegetables and pre-made salads.  If possible, shop around to discover where you can find the best prices. Consider other retailers such as dollar stores, larger wholesale stores, local fruit and vegetable stands, and farmers markets.  Do not shop when you are hungry. If you shop while hungry, it may be hard to stick to your list and budget.      Resist impulse buying. Use your grocery list as your official plan for the week.      Buy a variety of vegetables and fruits by purchasing fresh, frozen, and canned items.  Look at the top and bottom shelves for deals. Foods at eye level (eye level of an adult or child) are usually more expensive.  Be efficient with your time when shopping. The more time you spend at the store, the more money you are likely to spend.  To save money when choosing more expensive foods like meats and dairy:  ?Choose cheaper cuts of meat, such as bone-in chicken thighs and drumsticks instead of skinless and boneless chicken. When you are ready to prepare the chicken, you can remove the skin yourself to make it healthier.  ?Choose lean meats like chicken or turkey instead of beef.  ?Choose canned seafood, such as tuna, salmon, or sardines.  ?Buy eggs as a low-cost source of protein.  ?Buy dried beans and peas, such as lentils, split peas, or kidney beans instead of meats. Dried beans and peas are a good alternative source of protein.  ?Buy the larger tubs of yogurt instead of individual-sized containers.                        Choose water instead of sodas and other sweetened beverages.  Avoid buying chips, cookies, and other "junk food." These items are usually expensive and not healthy.  Meal planning  Do not eat out or get fast food. Prepare food at home.  Make a grocery list and make sure to bring it with you to the store.   Plan meals and snacks according to a grocery list and budget " "you create.  Use leftovers in your meal plan for the week.  Look for recipes where you can cook once and make enough food for two meals.  Include budget-friendly meals like stews, casseroles, and stir-auguste dishes.  Try some meatless meals or try "no cook" meals like salads.  Make sure that half your plate is filled with fruits or vegetables. Choose from fresh, frozen, or canned fruits and vegetables. If eating canned, remember to rinse them before eating. This will remove any excess salt added for packaging.            Summary  Eating healthy on a budget is possible if you plan your meals according to your budget, buy according to your budget and grocery list, and prepare food yourself.   Tips for buying more food on a limited budget include buying generic brands, using coupons only for foods you normally buy, and buying healthy items from the bulk bins when available.  Tips for buying cheaper food to replace expensive food include choosing cheaper, lean cuts of meat, and buying dried beans and peas.    Discuss any question you have with your doctor.            Why is taking care of your mouth/teeth/gums important?     Your mouth is the opening to your body.  If not kept clean, it can let in sickness to the rest of your body.     Oral Health Care (Dentists)                 City:  Provider Address Phone Number Insurance Plan   [place]:  None available                    Adonis Edwards, [place] [address] [facility] Adonis GARCIA [phone]   ADULTS ONLY Medicaid:  HB & AETNA ONLY             [place]         [facility] ([facility]) [address] [phone]  DENTURES ONLY ADULT Medicaid:  HB & AETNA ONLY             [facility] [address] [facility] [phone] All Medicaid/Medicare             Raymond Lundberg, [place] [address]  [facility] Raymond Carter [phone] Medicaid Children only 2 to 21           "   [place]         Fredrick Mohamud [address] [place] [phone] Accepts:   LHC, HB, Aetna         [facility] [address] [facility], [place] [phone] Medicare             [facility] [address], [place] [phone] Medicaid Children only 2 to 21             [facility] [address], [place] [phone] Medicaid Children only 2 to 21             [facility]  [address], [place] [phone] Medicaid Children only 5 yrs and younger:  lip/tongue tie             [facility] [address] [facility] [phone] Medicaid Children only 2 to 21          [facility]  [address] [facility]  [phone]           [facility] [address] [facility] [phone] All Medicaid/Medicare :  ADULTS             [facility] [address] [facility] [phone] All Medicaid/Medicare :  ADULTS             [facility] [address] [facility] [phone] Medicaid Children only 2 to 21             [facility]  [address] [place] [phone] Medicaid Children only 2 to 21             Dr. Ramila Saavedra,  [facility] [phone] Medicare for Dentures Only             [facility], [facility] [address] [facility] [address] [phone] All Medicaid/Medicare :   EXCEPT [facility]; ADULTS         Sherman Romero [address] [facility] [phone] Accepts:   LHC, HB, Aetna             [address] [phone]  UHC, IHC, HB, Aetna/Medicare :  ADULTS     [facility]; [place]: [phone]  [facility]; [place]: [phone]

## 2023-08-08 ENCOUNTER — PATIENT OUTREACH (OUTPATIENT)
Dept: FAMILY MEDICINE | Facility: CLINIC | Age: 28
End: 2023-08-08
Payer: MEDICAID

## 2023-08-08 LAB — PSYCHE PATHOLOGY RESULT: NORMAL

## 2023-08-23 DIAGNOSIS — Z3A.15 15 WEEKS GESTATION OF PREGNANCY: Primary | ICD-10-CM

## 2023-08-29 ENCOUNTER — OFFICE VISIT (OUTPATIENT)
Dept: FAMILY MEDICINE | Facility: CLINIC | Age: 28
End: 2023-08-29
Payer: MEDICAID

## 2023-08-29 VITALS
SYSTOLIC BLOOD PRESSURE: 115 MMHG | OXYGEN SATURATION: 98 % | BODY MASS INDEX: 19.26 KG/M2 | HEART RATE: 83 BPM | DIASTOLIC BLOOD PRESSURE: 78 MMHG | WEIGHT: 102 LBS | HEIGHT: 61 IN | RESPIRATION RATE: 20 BRPM | TEMPERATURE: 97 F

## 2023-08-29 DIAGNOSIS — K21.9 GASTROESOPHAGEAL REFLUX DISEASE, UNSPECIFIED WHETHER ESOPHAGITIS PRESENT: ICD-10-CM

## 2023-08-29 DIAGNOSIS — Z34.90 PREGNANCY, UNSPECIFIED GESTATIONAL AGE: ICD-10-CM

## 2023-08-29 DIAGNOSIS — Z3A.12 12 WEEKS GESTATION OF PREGNANCY: ICD-10-CM

## 2023-08-29 DIAGNOSIS — Z3A.15 15 WEEKS GESTATION OF PREGNANCY: Primary | ICD-10-CM

## 2023-08-29 LAB
APPEARANCE UR: CLEAR
BACTERIA #/AREA URNS AUTO: ABNORMAL /HPF
BILIRUB SERPL-MCNC: NEGATIVE MG/DL
BILIRUB UR QL STRIP.AUTO: NEGATIVE
BLOOD URINE, POC: NEGATIVE
CLARITY, POC UA: CLEAR
COLOR UR: YELLOW
COLOR, POC UA: YELLOW
GLUCOSE 1H P 100 G GLC PO SERPL-MCNC: 116 MG/DL (ref 100–180)
GLUCOSE UR QL STRIP.AUTO: ABNORMAL
GLUCOSE UR QL STRIP: NEGATIVE
KETONES UR QL STRIP.AUTO: NEGATIVE
KETONES UR QL STRIP: NEGATIVE
LEUKOCYTE ESTERASE UR QL STRIP.AUTO: NEGATIVE
LEUKOCYTE ESTERASE URINE, POC: NORMAL
NITRITE UR QL STRIP.AUTO: NEGATIVE
NITRITE, POC UA: NEGATIVE
PH UR STRIP.AUTO: 7.5 [PH]
PH, POC UA: 7
PROT UR QL STRIP.AUTO: ABNORMAL
PROTEIN, POC: NORMAL
RBC #/AREA URNS AUTO: ABNORMAL /HPF
RBC UR QL AUTO: NEGATIVE
SP GR UR STRIP.AUTO: 1.02 (ref 1–1.03)
SPECIFIC GRAVITY, POC UA: 1.02
SQUAMOUS #/AREA URNS LPF: ABNORMAL /HPF
UROBILINOGEN UR STRIP-ACNC: NORMAL
UROBILINOGEN, POC UA: 0.2
WBC #/AREA URNS AUTO: ABNORMAL /HPF

## 2023-08-29 PROCEDURE — 99213 OFFICE O/P EST LOW 20 MIN: CPT | Mod: PBBFAC

## 2023-08-29 PROCEDURE — 81001 URINALYSIS AUTO W/SCOPE: CPT

## 2023-08-29 PROCEDURE — 36415 COLL VENOUS BLD VENIPUNCTURE: CPT

## 2023-08-29 PROCEDURE — 81511 FTL CGEN ABNOR FOUR ANAL: CPT

## 2023-08-29 PROCEDURE — 82950 GLUCOSE TEST: CPT

## 2023-08-29 PROCEDURE — 81002 URINALYSIS NONAUTO W/O SCOPE: CPT | Mod: 59,PBBFAC

## 2023-08-29 PROCEDURE — 87088 URINE BACTERIA CULTURE: CPT

## 2023-08-29 RX ORDER — CALC/MAG/B COMPLEX/D3/HERB 61
15 TABLET ORAL DAILY
Qty: 30 CAPSULE | Refills: 4 | Status: SHIPPED | OUTPATIENT
Start: 2023-08-29 | End: 2023-11-14

## 2023-08-29 NOTE — PROGRESS NOTES
"[facility] OB OFFICE VISIT NOTE  Bennie Escamilla  [id number]  2023    Chief Complaint: Routine Prenatal Visit (HROB 15w6d, no complaints.)      Bennie Escamilla is a 28 y.o. female   at 15w6d  PATRICE 2024, by 1st trimester Ultrasound presenting to [facility] for routine OB visit.    Current Issues: none    Chronic Issues:   - Hx of gestational DM: diet controlled   Para Term  AB Living   2 1 1 0 0 1   SAB IAB Ectopic Multiple Live Births      0 0 0 0 1          # Outcome Date GA Lbr Dedrick/2nd Weight Sex Delivery Anes PTL Lv   2 Current                     1 Term 21     3.062 kg (6 lb 12 oz) F Vag-Spont   N NEHEMIAS      Gyn History:   - LMP: 22 (exact date)  - Menarche: 12 years old  - Menstrual Hx: 28 day intervals  - Hx of birth control: none  - Hx of STDs: denies  - History of Abnormal PAP: LSIL     Past Medical History: as above  Surgical History: none  Family History: not significant  Social History:  does not smoke or drink alcohol  Medications: PNV and prevacid    Review of Systems  Antepartum specific   - Fetal movements: no  - Vaginal bleeding: no  - Vaginal discharge:  mild white discharge  - Loss of fluid: no  - Contractions: no  - Headaches: no  - Vision changes: no  - Edema: no    Blood pressure 115/78, pulse 83, temperature 97.4 °F (36.3 °C), temperature source Oral, resp. rate 20, height 5' 1" (1.549 m), weight 46.3 kg (102 lb), last menstrual period 2023, SpO2 98 %, unknown if currently breastfeeding.   Physical Exam  Gen: in no acute distress  CVS: RRR, no r/g/m  Lungs: CTABL  Abd: NT, gravid, +BD  Ext: no edema  FHT: 150 by doppler    Current Medications:   Current Outpatient Medications   Medication Sig Dispense Refill    lansoprazole (PREVACID) 15 MG capsule Take 1 capsule (15 mg total) by mouth once daily. 30 capsule 4    PNV,calcium 72-iron-folic acid (PRENATAL VITAMIN PLUS LOW IRON) 27 mg iron- 1 mg Tab Take 1 tablet (1 each total) by mouth once daily. 30 tablet 5    " Oral Chemotherapy Nursing Assessment Note  Cycle #2 d8 .  Ixazomib / dexamethasone       Date:  Sep 09, 2020    Name:  Mela Momin   :  1936    Diagnosis: Primary N18.9 - Chronic kidney disease, unspecified,  Diagnosed   (Active)   Primary N18.3 - Chronic kidney disease, stage 3 (moderate),  Diagnosed   (Active)   Primary C90.00 - Multiple myeloma, without mention of having achieved remission,  Diagnosed   (Active)    Treatment Name: *Ixazomib/Rev/Dex    Toxicities: The following toxicities were assessed as a 1:  Anxiety - Mild symptoms; intervention not indicated  Fatigue - Fatigue relieved by rest          Vital Signs: Performed on Sep 09, 2020 12:39  Height - 143.00 cms   Weight - 41.25 kg (HIGH) shoes off   BSA - 1.28 sq.m   BMI - 20.1700   Temperature - 98.2 F   Pulse - 82 /min   Respiration - 18 /min   BP - 119/71 mm(hg)   Pain - 0    Allergies: No Known Allergies.  Allergies reviewed    Medication List:Calcium + D3  Levothroid  Pantoprazole Sodium  Sertraline HCl  ValtrexVirt-Caps  The Medication List was reviewed.    Current Labs:         Labs were reviewed with the patient.       Psychosocial assessment:    Choose one:    No new psychosocial concerns    ECOG performance status:   Choose:       2 ambulatory and capable of self care; up and about greater than 50% of waking hours        Oral Therapy Start Date:  20 - Ixazomib (Ninlaro) at 11 am  Dexamethasone (Decadron) at 9 am    Adherence Assessment:  Do you ever forget to take your medication    _____Yes  __x___No If yes, please explain:  Did you skip your medication for any reason _____Yes  __x___No If yes, please explain:  Sometimes if you feel worse when you take the medication, do you stop taking it        _____Yes  ____x_No If yes, please explain:  When you feel better do you sometimes stop taking your medication        _____Yes  ____x_No If yes, please explain:  Pt took medication wrong dose on the wrong day  .   Adherence Evaluation: (1 point for each yes; High adherence =0, Medium adherence  =1-2, Low adherence =3-4)      Score:1    Calendar/Diary Returned: __x___Yes  _____No _____NA      s/o:  Pt. here  with  nephew  for cycle 2 day 8  Ixazomib (Ninlaro) and  Dexamethasone (Decadron).  Pt. gets drug [facility].  Pt. and nephew report that she took her d1 medication incorrectly (see phone note ) She comes in today for d8 lab review she has taken her medication today as directed and tolerated well. She denies nausea diarrhea or vomiting  cbc per flow sheet. Nephew reports that he and his wife are dispensing her medications so no futher errors occur.   Safe handling reviewed in great detail.Calendar and discharge instructions reviewed.     a:  Cycle 2 day 8 Ixazomib (Ninlaro) and  Dexamethasone (Decadron).  Next dose Ixazomib (Ninlaro) and  Dexamethasone (Decadron).on 9/16/2020.      p:  RTC per calendar, pt. advised to call with any problems, questions or concerns.  Pt.  and nephew  verbalized understanding .    Reviewed side effects, discharge instructions including doses and administration instructions, safe handling of the medication and labs with patient.   Pt reported understanding. ____xx_Yes  _____No        Questions were answered and understanding was verbalized.    Electronically signed by,    Kesha Rain          promethazine (PHENERGAN) 25 MG suppository Place 1 suppository (25 mg total) rectally every 6 (six) hours as needed for Nausea. 12 suppository 1     No current facility-administered medications for this visit.       Labs:  Urine dipstick:  8/29/23  Component 08:38   Color, UA Yellow    pH, UA 7.0    WBC, UA trace    Nitrite, UA negative    Protein, POC trace    Glucose, UA negative    Ketones, UA negative    Urobilinogen, UA 0.2    Bilirubin, POC negative    Blood, UA negative    Clarity, UA Clear    Spec Grav UA 1.025         Initial OB Labs 8/3/2023  - Blood Type and Rh: O+  - Antibody Screen: Neg  - CBC H/H: 12.7/37.2  - HIV: NR  - RPR: NR  - GC: not detected  - CT: Not detected  - HBsAg: NR  - HCVAb: NR  - Rubella: immune  - Varicella: Immune  - UA & Culture: +GBS ( 25,000-50,000 colonies); repeat urine Cx pending  - Sickle Cell Screen: neg  - PAP: NIL   - Influenza vaccine date:       15-20 Weeks Lab 8/29/23  - Quad Screen: ordered    28 Week Lab  - 1H GTT:   - Rhogam:   - Date of Tdap:   - CBC H/H:   - RPR:   - BTL consent:     37 Week Lab  - CBC H/H:   - RPR:   - GBS Culture:   - HIV:   - Cervical GC:     Imaging:   Initial US  Single live intrauterine gestation with estimated gestational age of 7 weeks and 4 days by biometry. Recommend full fetal survey at 18-20 weeks.     20 wk anatomy US  Not yet completed, scheduled for 9/27/2023    Assessment:   1. 15 weeks gestation of pregnancy   - OB Protocol   - PNVs  - Urine dip reviewed as above  - Indicated labs: PENDING  - Mother plans to breast and bottlefeed  - Postpartum contraception discussion: undecided  - Reports to ED if LOF, severe abdominal pain/contractions, bleeding, ect...       2. Gastroesophageal reflux disease, unspecified whether esophagitis present   -Continue prevacid 15mg daily     4. Hx of gestational diabetes  -Per MFM, GTT at 16 weeks  -1h GTT performed today, pending result     5.      GBS bacteriuria            -+GBS screen (25-50,000  colonies) on first trimester Cx           -Repeat urine Cx pending        Return to  clinic in 4 weeks     Orders Placed This Encounter   Procedures    Urine Culture High Risk    Urinalysis, Reflex to Urine Culture    Quad Screen Maternal, Serum    Glucose Tolerance 1 Hour    POCT URINE DIPSTICK WITHOUT MICROSCOPE       Maurisio Lopez  [facility] HO-2

## 2023-08-30 NOTE — PROGRESS NOTES
I have personally reviewed the review of systems (ROS) and past, family and social histories (PFSH) documented above by the resident.  I have reviewed the care furnished by the resident during the encounter, including a review of the patient's medical history, the resident's findings on physical examination, diagnosis, and the treatment plan.  I participated in the management of the patient and was immediately available throughout the encounter.   I was physically present during all key portions of the service(s) provided with the resident.  Services were furnished in a primary care center located in the outpatient department of a [facility].

## 2023-08-31 LAB
# FETUSES: NORMAL
2ND TRIMESTER 4 SCREEN SERPL-IMP: NORMAL
AFP ADJ MOM SERPL: 0.84 MOM
AFP SERPL IA-MCNC: 33.1 NG/ML
AGE AT DELIVERY: NORMAL
B-HCG ADJ MOM SERPL: 0.76 MOM
BACTERIA UR CULT: ABNORMAL
CHORION TYPE: NORMAL
COLLECT DATE: NORMAL
CURRENT SMOKER: NORMAL
FET TS 21 RISK FROM MAT AGE: NORMAL
GA METHOD: NORMAL
GA US.COMPOSITE.EST: NORMAL WK,D
HCG SERPL IA-ACNC: 32.4 IU/ML
HX OF NTD QL: NO
HX OF NTD QL: NO
HX OF TRISOMY 21 QL: NO
IDDM PATIENT QL: NO
INHIBIN A ADJ MOM SERPL: 0.74 MOM
INHIBIN SERPL-MCNC: 127 PG/ML
IVF PREGNANCY: NO
LABORATORY COMMENT REPORT: NORMAL
M PHYSICIAN PHONE NUMBER: NORMAL
MATERNAL RISK FACTORS: NORMAL
NEURAL TUBE DEFECT RISK FETUS: NORMAL %
RECOM F/U: NORMAL
TEST PERFORMANCE INFO SPEC: NORMAL
TS 18 RISK FETUS: NORMAL
TS 21 RISK FETUS: NORMAL
U ESTRIOL ADJ MOM SERPL: 1 MOM
U ESTRIOL SERPL-MCNC: 0.89 NG/ML

## 2023-09-01 ENCOUNTER — TELEPHONE (OUTPATIENT)
Dept: FAMILY MEDICINE | Facility: CLINIC | Age: 28
End: 2023-09-01
Payer: MEDICAID

## 2023-09-01 ENCOUNTER — PATIENT OUTREACH (OUTPATIENT)
Dept: FAMILY MEDICINE | Facility: CLINIC | Age: 28
End: 2023-09-01
Payer: MEDICAID

## 2023-09-01 NOTE — PROGRESS NOTES
Follow-up: 9/29/2023    Appointment reminder given for Patient declined appointment reminders.        Patient verbalized understanding: Yes        Other comments: Spoke with patient via [employer]  Vanita [id number].  Identity verified.  Pt states she is doing well.    Patient denies abdominal pain, vaginal bleeding or leaking fluid, CP, SOB, edema, HA, visual changes, contractions, but endorses white vaginal discharge.  This was discussed with doctor at 8/29/23 office visit.  Instructed patient if she has any of the above symptoms to report to the ED for evaluation.  Pt had OB appt 8/29/23 continue medications, 1 hour GTT (116), repeat urine culture (Positive for GBS) RTC 4 weeks (9/26/23 0705).  Maryjane Mahoney and [facility] notified of positive urine culture.  Stressed the importance of medication compliance, eating a healthy diet, drinking plenty of water and keeping appointments. Patient verbalized understanding to all instructions.                   Education given on See above

## 2023-09-01 NOTE — TELEPHONE ENCOUNTER
Good morning,     Please see urine culture results.     Thank you,    Kayley Caldwell  LPYOHAN Care Coordinator  [phone]

## 2023-09-27 ENCOUNTER — PROCEDURE VISIT (OUTPATIENT)
Dept: MATERNAL FETAL MEDICINE | Facility: CLINIC | Age: 28
End: 2023-09-27
Payer: MEDICAID

## 2023-09-27 DIAGNOSIS — Z3A.15 15 WEEKS GESTATION OF PREGNANCY: ICD-10-CM

## 2023-09-27 PROCEDURE — 76805 US MFM PROCEDURE (VIEWPOINT): ICD-10-PCS | Mod: S$GLB,,, | Performed by: OBSTETRICS & GYNECOLOGY

## 2023-09-27 PROCEDURE — 76805 OB US >/= 14 WKS SNGL FETUS: CPT | Mod: S$GLB,,, | Performed by: OBSTETRICS & GYNECOLOGY

## 2023-09-29 ENCOUNTER — PATIENT OUTREACH (OUTPATIENT)
Dept: FAMILY MEDICINE | Facility: CLINIC | Age: 28
End: 2023-09-29
Payer: MEDICAID

## 2023-09-29 NOTE — PROGRESS NOTES
Appointment Reminder: 10/23/2023  Follow-up: 10/23/2023    Appointment reminder given for Spoke with patient via [employer]  Fan [id number].  Identity verified.  Reminded patient of appointment with [facility] 10/3/2023 0940.  Patient verbalized understanding.         Patient verbalized understanding. Yes        Other comments: Spoke with patient via [employer]  Fan [id number].  Pt states she is doing well.  Patient denies abdominal pain, vaginal bleeding or leaking fluid, CP, SOB, edema, HA, visual changes, contractions, but endorses positive fetal movement.  Instructed patient if she has any of the above symptoms to report to the ED for evaluation.  Pt had a maternal fetal medicine ultrasound done 9/27/2023.  Pt denies any ED visits.  Patient denies any SDOH barriers at this time. Stressed the importance of medication compliance, keeping appointments, eating a healthy diet and drinking plenty of water.  Patient verbalized understanding to all instructions.                     Education given on: See above

## 2023-10-10 ENCOUNTER — OFFICE VISIT (OUTPATIENT)
Dept: FAMILY MEDICINE | Facility: CLINIC | Age: 28
End: 2023-10-10
Payer: MEDICAID

## 2023-10-10 VITALS
SYSTOLIC BLOOD PRESSURE: 100 MMHG | RESPIRATION RATE: 18 BRPM | HEART RATE: 92 BPM | HEIGHT: 61 IN | BODY MASS INDEX: 20.32 KG/M2 | OXYGEN SATURATION: 99 % | WEIGHT: 107.63 LBS | TEMPERATURE: 98 F | DIASTOLIC BLOOD PRESSURE: 67 MMHG

## 2023-10-10 DIAGNOSIS — Z3A.21 21 WEEKS GESTATION OF PREGNANCY: Primary | ICD-10-CM

## 2023-10-10 LAB
BILIRUB SERPL-MCNC: NORMAL MG/DL
BLOOD URINE, POC: NORMAL
CLARITY, POC UA: CLEAR
COLOR, POC UA: YELLOW
GLUCOSE UR QL STRIP: NORMAL
KETONES UR QL STRIP: NORMAL
LEUKOCYTE ESTERASE URINE, POC: NORMAL
NITRITE, POC UA: NORMAL
PH, POC UA: 7
PROTEIN, POC: NORMAL
SPECIFIC GRAVITY, POC UA: 1.02
UROBILINOGEN, POC UA: 0.2

## 2023-10-10 PROCEDURE — 90471 IMMUNIZATION ADMIN: CPT | Mod: PBBFAC

## 2023-10-10 PROCEDURE — 81002 URINALYSIS NONAUTO W/O SCOPE: CPT | Mod: PBBFAC

## 2023-10-10 PROCEDURE — 90686 IIV4 VACC NO PRSV 0.5 ML IM: CPT | Mod: PBBFAC

## 2023-10-10 PROCEDURE — 99214 OFFICE O/P EST MOD 30 MIN: CPT | Mod: PBBFAC

## 2023-10-10 RX ADMIN — INFLUENZA VIRUS VACCINE 0.5 ML: 15; 15; 15; 15 SUSPENSION INTRAMUSCULAR at 08:10

## 2023-10-10 NOTE — PROGRESS NOTES
"  [facility] OB OFFICE VISIT NOTE     Primary PCP: non-[facility]    Chief Complaint     Bennie Escamilla is a 28 y.o. female   21w6d 2024, by Ultrasound presenting to [facility] for routine OB visit.    Current Issues:   No acute concerns today. Would like results of quad screen. Anatomy scan done at tend of September; everything looked good with baby at that time. Previous GTT done at 16w per MFM recs and was wnl. Feels baby moving, denied any dysuria or increased urinary frequency.     Chronic Issues:   - Hx of gestational DM: diet controlled     Obstetrics History     OB History          2    Para   1    Term   1       0    AB   0    Living   1         SAB   0    IAB   0    Ectopic   0    Multiple   0    Live Births   1                Gynecology History   - LMP: 22 (exact date)  - Menarche: 12 years old  - Menstrual Hx: 28 day intervals  - Hx of birth control: none  - Hx of STDs: denies  - History of Abnormal PAP: LSIL     Medical History    Past Medical History: as above  Surgical History: none  Family History: not significant  Social History:  does not smoke or drink alcohol  Medications: PNV and prevacid    Review of Systems   Review of Systems    Antepartum specific   - Fetal movements: yes  - Vaginal bleeding: no  - Vaginal discharge: no  - Loss of fluid: no  - Contractions: no  - Headaches: no  - Vision changes: no  - Edema: no    Vital Signs     Vitals:    10/10/23 0809   BP: 100/67   BP Location: Left arm   Patient Position: Sitting   BP Method: Medium (Automatic)   Pulse: 92   Resp: 18   Temp: 98.2 °F (36.8 °C)   TempSrc: Oral   SpO2: 99%   Weight: 48.8 kg (107 lb 9.6 oz)   Height: 5' 1" (1.549 m)      Physical Exam   General: in no acute distress  RESP: clear to auscultation bilaterally, non labored  CV: regular rate and rhythm, no murmurs, no edema  ABD: gravid, nontender, BS+  FHTs: 140s - 150s bpm via Doppler  Fundal height: 21 cm    Current Medications:      Current Outpatient " Medications   Medication Sig Dispense Refill    lansoprazole (PREVACID) 15 MG capsule Take 1 capsule (15 mg total) by mouth once daily. 30 capsule 4    PNV,calcium 72-iron-folic acid (PRENATAL VITAMIN PLUS LOW IRON) 27 mg iron- 1 mg Tab Take 1 tablet (1 each total) by mouth once daily. 90 tablet 1    promethazine (PHENERGAN) 25 MG suppository Place 1 suppository (25 mg total) rectally every 6 (six) hours as needed for Nausea. 12 suppository 1     No current facility-administered medications for this visit.     Labs   Urine dipstick:   Recent Lab Results         10/10/23  0816        Color, UA Yellow       Clarity, UA Clear       Bilirubin, POC neg       Spec Grav UA 1.020       pH, UA 7.0       Protein, POC neg       Urobilinogen, UA 0.2       Nitrite, UA neg       Glucose, UA neg       Ketones, UA neg       Blood, UA neg       WBC, UA neg             Initial OB Labs 8/3/2023  - Blood Type and Rh: O+  - Antibody Screen: Neg  - CBC H/H: 12.7/37.2  - HIV: NR  - RPR: NR  - GC: not detected  - CT: Not detected  - HBsAg: NR  - HCVAb: NR  - Rubella: immune  - Varicella: Immune  - UA & Culture: +GBS ( 25,000-50,000 colonies); repeat urine Cx confirmed  - Sickle Cell Screen: neg  - PAP: NIL   - Influenza vaccine date: 10/10/23    15-20 Weeks Lab 8/29/23  - Quad Screen: no increased risk     28 Week Lab  - 1H GTT:   - Rhogam:   - Date of Tdap:   - CBC H/H:   - RPR:   - BTL consent:     37 Week Lab  - CBC H/H:   - RPR:   - GBS Culture:   - HIV:   - Cervical GC:   - Cervical Exam:     Imaging    Initial US: 7/2/23  Single live intrauterine gestation with estimated gestational age of 7 weeks and 4 days by biometry. Recommend full fetal survey at 18-20 weeks    Anatomy Scan: 9/28/23  A [facility] living IUP is identified.   Fetal size is appropriate for established dating.   No fetal structural malformations are identified.   Cervical length by TA scanning is normal.   Placental location is anterior without evidence of  previa.    Assessment     1. 21 weeks gestation of pregnancy      Plan   - OB Protocol   - PNVs  - Urine dip reviewed as above. H/O GBS in urine. Will need repeat urine culture next apt. Pt counseled on symptoms of dysuria or increased urinary frequency  - Routine labs: reviewed  - Flu vaccine received today   - Mother plans to breast and/or bottlefeed  - Postpartum contraception discussion: PENDING  - Labor precautions discussed in depth  - Return to clinic in 4 weeks w/ continuity clinic    Maykel Hernandez MD  [facility]

## 2023-10-11 NOTE — PROGRESS NOTES
I have personally reviewed the review of systems (ROS) and past, family and social histories (PFSH) documented above by the resident.  I have reviewed the care furnished by the resident during the encounter, including a review of the patient's medical history, the resident's findings on physical examination, diagnosis, and the treatment plan.  I participated in the management of the patient and was immediately available throughout the encounter.   I was physically present during all key portions of the service(s) provided with the resident.  Services were furnished in a primary care center located in the outpatient department of a [facility].

## 2023-10-23 ENCOUNTER — PATIENT OUTREACH (OUTPATIENT)
Dept: FAMILY MEDICINE | Facility: CLINIC | Age: 28
End: 2023-10-23
Payer: MEDICAID

## 2023-10-23 DIAGNOSIS — Z36.89 ENCOUNTER FOR FETAL ANATOMIC SURVEY: Primary | ICD-10-CM

## 2023-10-23 NOTE — PROGRESS NOTES
Follow-up: 11/20/2023    Appointment reminder given for [facility] ultrasound 10/25/2023 1340.        Patient verbalized understanding.        Other comments: Spoke with patient via [employer]  [place] [id number].  Identity verified.  Patient denies abdominal pain, vaginal bleeding or leaking fluid, CP, SOB, edema, HA, visual changes, contractions, but endorses positive fetal movement.  Instructed patient if she has any of the above symptoms to report to the ED for evaluation.  Pt states she had a brief episode of dizziness that lasted a few seconds.  Instructed if the dizziness continue to notify her provider.  Pt states her knee was sore.  Instructed to try ice and or heat for 20 minutes at a time several times a day.  Pt had an OB appt 10/10/23, CPM, flu vaccine, RTC 4 weeks.  She states she did well with the flu vaccine.  Patient denies any SDOH barriers at this time and no ED visits.  Stressed the importance of medication compliance and keeping appointments.  Patient verbalized understanding to all instructions.                       Education given on See above.

## 2023-10-25 ENCOUNTER — PROCEDURE VISIT (OUTPATIENT)
Dept: MATERNAL FETAL MEDICINE | Facility: CLINIC | Age: 28
End: 2023-10-25
Payer: MEDICAID

## 2023-10-25 DIAGNOSIS — Z36.89 ENCOUNTER FOR FETAL ANATOMIC SURVEY: ICD-10-CM

## 2023-10-25 PROCEDURE — 76816 US MFM PROCEDURE (VIEWPOINT): ICD-10-PCS | Mod: S$GLB,,, | Performed by: OBSTETRICS & GYNECOLOGY

## 2023-10-25 PROCEDURE — 76816 OB US FOLLOW-UP PER FETUS: CPT | Mod: S$GLB,,, | Performed by: OBSTETRICS & GYNECOLOGY

## 2023-11-04 ENCOUNTER — HOSPITAL ENCOUNTER (OUTPATIENT)
Facility: HOSPITAL | Age: 28
Discharge: HOME | DRG: 833 | End: 2023-11-04
Attending: STUDENT IN AN ORGANIZED HEALTH CARE EDUCATION/TRAINING PROGRAM | Admitting: STUDENT IN AN ORGANIZED HEALTH CARE EDUCATION/TRAINING PROGRAM
Payer: MEDICAID

## 2023-11-04 VITALS
TEMPERATURE: 99 F | DIASTOLIC BLOOD PRESSURE: 67 MMHG | BODY MASS INDEX: 20.77 KG/M2 | WEIGHT: 110 LBS | RESPIRATION RATE: 22 BRPM | OXYGEN SATURATION: 100 % | HEIGHT: 61 IN | SYSTOLIC BLOOD PRESSURE: 100 MMHG | HEART RATE: 85 BPM

## 2023-11-04 DIAGNOSIS — R55 SYNCOPE: ICD-10-CM

## 2023-11-04 LAB
ALBUMIN SERPL-MCNC: 3.5 G/DL (ref 3.5–5)
ALBUMIN/GLOB SERPL: 1 RATIO (ref 1.1–2)
ALP SERPL-CCNC: 47 UNIT/L (ref 40–150)
ALT SERPL-CCNC: 11 UNIT/L (ref 0–55)
APPEARANCE UR: CLEAR
AST SERPL-CCNC: 17 UNIT/L (ref 5–34)
BACTERIA #/AREA URNS AUTO: ABNORMAL /HPF
BASOPHILS # BLD AUTO: 0.04 X10(3)/MCL
BASOPHILS NFR BLD AUTO: 0.3 %
BILIRUB SERPL-MCNC: 0.2 MG/DL
BILIRUB UR QL STRIP.AUTO: NEGATIVE
BUN SERPL-MCNC: 9.1 MG/DL (ref 7–18.7)
CALCIUM SERPL-MCNC: 10.3 MG/DL (ref 8.4–10.2)
CHLORIDE SERPL-SCNC: 104 MMOL/L (ref 98–107)
CO2 SERPL-SCNC: 23 MMOL/L (ref 22–29)
COLOR UR AUTO: YELLOW
CREAT SERPL-MCNC: 0.61 MG/DL (ref 0.55–1.02)
EOSINOPHIL # BLD AUTO: 0.09 X10(3)/MCL (ref 0–0.9)
EOSINOPHIL NFR BLD AUTO: 0.7 %
ERYTHROCYTE [DISTWIDTH] IN BLOOD BY AUTOMATED COUNT: 12.5 % (ref 11.5–17)
GFR SERPLBLD CREATININE-BSD FMLA CKD-EPI: >60 MLS/MIN/1.73/M2
GLOBULIN SER-MCNC: 3.6 GM/DL (ref 2.4–3.5)
GLUCOSE SERPL-MCNC: 80 MG/DL (ref 74–100)
GLUCOSE UR QL STRIP.AUTO: ABNORMAL
HCT VFR BLD AUTO: 30.5 % (ref 37–47)
HGB BLD-MCNC: 10.7 G/DL (ref 12–16)
HYALINE CASTS #/AREA URNS LPF: ABNORMAL /LPF
IMM GRANULOCYTES # BLD AUTO: 0.54 X10(3)/MCL (ref 0–0.04)
IMM GRANULOCYTES NFR BLD AUTO: 4.4 %
KETONES UR QL STRIP.AUTO: NEGATIVE
LEUKOCYTE ESTERASE UR QL STRIP.AUTO: NEGATIVE
LYMPHOCYTES # BLD AUTO: 2.03 X10(3)/MCL (ref 0.6–4.6)
LYMPHOCYTES NFR BLD AUTO: 16.4 %
MAGNESIUM SERPL-MCNC: 1.4 MG/DL (ref 1.6–2.6)
MCH RBC QN AUTO: 32.2 PG (ref 27–31)
MCHC RBC AUTO-ENTMCNC: 35.1 G/DL (ref 33–36)
MCV RBC AUTO: 91.9 FL (ref 80–94)
MONOCYTES # BLD AUTO: 0.62 X10(3)/MCL (ref 0.1–1.3)
MONOCYTES NFR BLD AUTO: 5 %
MUCOUS THREADS URNS QL MICRO: ABNORMAL /LPF
NEUTROPHILS # BLD AUTO: 9.06 X10(3)/MCL (ref 2.1–9.2)
NEUTROPHILS NFR BLD AUTO: 73.2 %
NITRITE UR QL STRIP.AUTO: NEGATIVE
NRBC BLD AUTO-RTO: 0 %
PH UR STRIP.AUTO: 7 [PH]
PLATELET # BLD AUTO: 184 X10(3)/MCL (ref 130–400)
PMV BLD AUTO: 8.8 FL (ref 7.4–10.4)
POCT GLUCOSE: 75 MG/DL (ref 70–110)
POTASSIUM SERPL-SCNC: 3.5 MMOL/L (ref 3.5–5.1)
PROT SERPL-MCNC: 7.1 GM/DL (ref 6.4–8.3)
PROT UR QL STRIP.AUTO: ABNORMAL
RBC # BLD AUTO: 3.32 X10(6)/MCL (ref 4.2–5.4)
RBC #/AREA URNS AUTO: ABNORMAL /HPF
RBC UR QL AUTO: NEGATIVE
SODIUM SERPL-SCNC: 136 MMOL/L (ref 136–145)
SP GR UR STRIP.AUTO: 1.01 (ref 1–1.03)
SQUAMOUS #/AREA URNS LPF: ABNORMAL /HPF
TROPONIN I SERPL-MCNC: <0.01 NG/ML (ref 0–0.04)
UROBILINOGEN UR STRIP-ACNC: NORMAL
WBC # SPEC AUTO: 12.38 X10(3)/MCL (ref 4.5–11.5)
WBC #/AREA URNS AUTO: ABNORMAL /HPF

## 2023-11-04 PROCEDURE — 63600175 PHARM REV CODE 636 W HCPCS

## 2023-11-04 PROCEDURE — 93005 ELECTROCARDIOGRAM TRACING: CPT

## 2023-11-04 PROCEDURE — 25000003 PHARM REV CODE 250

## 2023-11-04 PROCEDURE — 84484 ASSAY OF TROPONIN QUANT: CPT

## 2023-11-04 PROCEDURE — 11000001 HC ACUTE MED/SURG PRIVATE ROOM

## 2023-11-04 PROCEDURE — 80053 COMPREHEN METABOLIC PANEL: CPT

## 2023-11-04 PROCEDURE — 83735 ASSAY OF MAGNESIUM: CPT

## 2023-11-04 PROCEDURE — G0378 HOSPITAL OBSERVATION PER HR: HCPCS

## 2023-11-04 PROCEDURE — 93010 EKG 12-LEAD: ICD-10-PCS | Mod: ,,, | Performed by: INTERNAL MEDICINE

## 2023-11-04 PROCEDURE — 82962 GLUCOSE BLOOD TEST: CPT

## 2023-11-04 PROCEDURE — 81001 URINALYSIS AUTO W/SCOPE: CPT

## 2023-11-04 PROCEDURE — 96361 HYDRATE IV INFUSION ADD-ON: CPT

## 2023-11-04 PROCEDURE — 85025 COMPLETE CBC W/AUTO DIFF WBC: CPT

## 2023-11-04 PROCEDURE — 93010 ELECTROCARDIOGRAM REPORT: CPT | Mod: ,,, | Performed by: INTERNAL MEDICINE

## 2023-11-04 PROCEDURE — 96365 THER/PROPH/DIAG IV INF INIT: CPT

## 2023-11-04 PROCEDURE — 99285 EMERGENCY DEPT VISIT HI MDM: CPT | Mod: 25

## 2023-11-04 RX ORDER — MAGNESIUM SULFATE 1 G/100ML
1 INJECTION INTRAVENOUS ONCE
Status: COMPLETED | OUTPATIENT
Start: 2023-11-04 | End: 2023-11-04

## 2023-11-04 RX ADMIN — MAGNESIUM SULFATE IN DEXTROSE 1 G: 10 INJECTION, SOLUTION INTRAVENOUS at 02:11

## 2023-11-04 RX ADMIN — SODIUM CHLORIDE 1000 ML: 9 INJECTION, SOLUTION INTRAVENOUS at 01:11

## 2023-11-04 NOTE — ED PROVIDER NOTES
Encounter Date: 2023       History     Chief Complaint   Patient presents with    Dizziness     Pt reports dizziness while in [employer] and pallor per ems. . 25 weeks pregnant. Pt has wet spot on pants but no feeling of rush of fluids. L&D states eval pt down here then can send pt up there to evaluate baby after     The patient is a 28 y.o. female, 25 weeks pregnant, who presents to the Emergency Department with a chief complaint of syncopal episode.  Patient states that she was in [employer] when she became dizzy and thinks that she passed out.  She states that she remembers everything that happened.  She denies any symptoms at all at this time.  Symptoms began today and have been resolved since onset. Her pain is currently rated as a 0/10 in severity and described . Associated symptoms include nothing. Symptoms are aggravated with nothing and there are no alleviating factors. The patient denies chest pain, shortness of breath, dizziness, abdominal pain, vaginal bleeding, or vaginal discharge. She reports taking nothing prior to arrival with no relief of symptoms. No other reported symptoms at this time.      The history is provided by the patient. No  was used.   Dizziness  This is a new problem. The current episode started 1 to 2 hours ago. The problem occurs rarely. The problem has been resolved. Pertinent negatives include no chest pain, no abdominal pain, no headaches and no shortness of breath. Nothing aggravates the symptoms. Nothing relieves the symptoms. She has tried nothing for the symptoms. The treatment provided no relief.     Review of patient's allergies indicates:  No Known Allergies  Past Medical History:   Diagnosis Date    Diabetes mellitus     gestational     History reviewed. No pertinent surgical history.  History reviewed. No pertinent family history.  Social History     Tobacco Use    Smoking status: Never     Passive exposure: Never    Smokeless tobacco: Never    Substance Use Topics    Alcohol use: Never    Drug use: Never     Review of Systems   Constitutional:  Negative for fever.   HENT:  Negative for sore throat.    Respiratory:  Negative for shortness of breath.    Cardiovascular:  Negative for chest pain.   Gastrointestinal:  Negative for abdominal pain and nausea.   Genitourinary:  Negative for dysuria.   Musculoskeletal:  Negative for back pain.   Skin:  Negative for rash.   Neurological:  Positive for dizziness and syncope. Negative for seizures, weakness and headaches.   Hematological:  Does not bruise/bleed easily.   All other systems reviewed and are negative.      Physical Exam     Initial Vitals [11/04/23 1224]   BP Pulse Resp Temp SpO2   107/68 94 16 98.1 °F (36.7 °C) 99 %      MAP       --         Physical Exam    Nursing note and vitals reviewed.  Constitutional: She appears well-developed and well-nourished.   HENT:   Head: Normocephalic.   Right Ear: Hearing and tympanic membrane normal.   Left Ear: Hearing and tympanic membrane normal.   Mouth/Throat: Uvula is midline, oropharynx is clear and moist and mucous membranes are normal.   Eyes: Conjunctivae and EOM are normal. Pupils are equal, round, and reactive to light.   Cardiovascular:  Normal rate, regular rhythm, normal heart sounds and normal pulses.           Pulmonary/Chest: Effort normal and breath sounds normal.   Abdominal: Abdomen is soft. Bowel sounds are normal. There is no abdominal tenderness.   Gravid abdomen     Lymphadenopathy:     She has no cervical adenopathy.   Neurological: She is alert. GCS eye subscore is 4. GCS verbal subscore is 5. GCS motor subscore is 6.   Skin: Skin is warm, dry and intact. Capillary refill takes less than 2 seconds.         ED Course   Procedures  Labs Reviewed   COMPREHENSIVE METABOLIC PANEL - Abnormal; Notable for the following components:       Result Value    Calcium Level Total 10.3 (*)     Globulin 3.6 (*)     Albumin/Globulin Ratio 1.0 (*)     All  other components within normal limits   MAGNESIUM - Abnormal; Notable for the following components:    Magnesium Level 1.40 (*)     All other components within normal limits   URINALYSIS, REFLEX TO URINE CULTURE - Abnormal; Notable for the following components:    Protein, UA Trace (*)     Glucose, UA Trace (*)     WBC, UA 6-10 (*)     Mucous, UA Trace (*)     Hyaline Casts, UA 0-2 (*)     All other components within normal limits   CBC WITH DIFFERENTIAL - Abnormal; Notable for the following components:    WBC 12.38 (*)     RBC 3.32 (*)     Hgb 10.7 (*)     Hct 30.5 (*)     MCH 32.2 (*)     IG# 0.54 (*)     All other components within normal limits   CBC W/ AUTO DIFFERENTIAL    Narrative:     The following orders were created for panel order CBC auto differential.  Procedure                               Abnormality         Status                     ---------                               -----------         ------                     CBC with Differential[[id number]]       Abnormal            Final result                 Please view results for these tests on the individual orders.   TROPONIN I   POCT GLUCOSE     EKG Readings: (Independently Interpreted)   Rhythm: Normal Sinus Rhythm. Heart Rate: 84. Ectopy: No Ectopy. Conduction: Normal. ST Segments: Normal ST Segments. T Waves: Normal. Axis: Normal. Clinical Impression: Normal Sinus Rhythm Other Impression: Normal sinus rhythm with sinus arrhythmia       Imaging Results    None          Medications   magnesium sulfate in dextrose IVPB (premix) 1 g (1 g Intravenous New Bag 11/4/23 1430)   sodium chloride 0.9% bolus 1,000 mL 1,000 mL (0 mLs Intravenous Stopped 11/4/23 1400)     Medical Decision Making  The patient is a 28 y.o. female, 25 weeks pregnant, who presents to the Emergency Department with a chief complaint of syncopal episode.  Patient states that she was in [employer] when she became dizzy and thinks that she passed out.  She states that she remembers  everything that happened.  She denies any symptoms at all at this time.  Symptoms began today and have been resolved since onset. Her pain is currently rated as a 0/10 in severity and described . Associated symptoms include nothing. Symptoms are aggravated with nothing and there are no alleviating factors. The patient denies chest pain, shortness of breath, dizziness, abdominal pain, vaginal bleeding, or vaginal discharge. She reports taking nothing prior to arrival with no relief of symptoms. No other reported symptoms at this time.      Differential diagnoses include but are not limited to electrolyte derangement, dehydration, vasovagal syncope    Problems Addressed:  Syncope: acute illness or injury    Amount and/or Complexity of Data Reviewed  Labs: ordered. Decision-making details documented in ED Course.  Radiology: ordered.  Discussion of management or test interpretation with external provider(s): Discussed with ED attending, , he had face to face encounter with patient.     Risk  Prescription drug management.               ED Course as of 11/04/23 1521   Sat Nov 04, 2023   1315 Fetal heart tones 157 [LM]   1330 Orthostatic vital signs negative [LM]   1351 EKG performed at 1:24 p.m..  Normal sinus rhythm with sinus arrhythmia.  Rate 84, normal axis. No ischemic changes [LM]   1400 Patient's magnesium is 1.40 but otherwise her labs unremarkable.  She states that all of her symptoms have resolved at this time.  Will send patient up to labor and delivery evaluation of baby.  This case has been discussed with Dr. Gray who had face-to-face encounter [LM]   1417 Discussed with FRANCESCO Thompson in labor and delivery.  Will send patient a for further evaluation the baby. [LM]      ED Course User Index  [LM] Nazia Wilder NP                    Clinical Impression:   Final diagnoses:  [R55] Syncope        ED Disposition Condition    Send to L&D Stable                Nazia Wilder NP  11/04/23 1521

## 2023-11-04 NOTE — ED PROVIDER NOTES
"       [facility] NOTE  [facility]     Admit Date: 2023  [facility] Physician: Maite Shane, NP  Primary OBGYN:     Admit Diagnosis/Chief Complaint:  Syncopal episode, 25.3wks  Discharge Diagnosis:     Chief Complaint   Patient presents with    Dizziness     Pt reports dizziness while in [employer] and pallor per ems. . 25 weeks pregnant. Pt has wet spot on pants but no feeling of rush of fluids. L&D states eval pt down here then can send pt up there to evaluate baby after       Hospital Course:  Bennie Escamilla is a 28 y.o.  at 25w3d transferred from ED for OB monitoring post syncopal episode this morning at 1100. Pt states while shopping in [employer] she became dizzy, weak. She reports then sliding to a sitting position. States she remembers the entire episode. She denies hitting head or abdomen. States she ate at 930am, milk and Chinese bread with eggs. She denies any vaginal bleeding, loss of fluid, abdominal pain, cramping or contractions. She denies any dizziness, weakness at this time.     This IUP is complicated by history of GDM.    Patient denies headache, vision changes, RUQ pain, dysuria, fever, and nausea/vomiting.  Fetal Movement: reports movement    /67   Pulse 86   Temp 98.6 °F (37 °C) (Oral)   Resp 18   Ht 5' 1" (1.549 m)   Wt 49.9 kg (110 lb)   LMP 2023 (Approximate)   SpO2 100%   BMI 20.78 kg/m²   Temp:  [98.1 °F (36.7 °C)-98.6 °F (37 °C)] 98.6 °F (37 °C)  Pulse:  [81-94] 86  Resp:  [16-18] 18  SpO2:  [98 %-100 %] 100 %  BP: ()/(62-76) 100/67    General: in no apparent distress in no respiratory distress and acyanotic alert oriented times 3 normal vitals cooperative  Cardiovascular: regular rate and rhythm no murmurs  Respiratory: clear to auscultation, no wheezes, rales or rhonchi, symmetric air entry unlabored  Abdominal: soft, nontender, nondistended, no abnormal masses, no epigastric pain FHT present No visible injuries, abrasions, " bruising  Back: lumbar tenderness absent CVA tenderness none suprapubic tenderness absent  Extremeties no redness or tenderness in the calves or thighs no edema      EFM: 150, reassuring  TOCO: none      Medical Decision Making:      LABS:     Recent Results (from the past 24 hour(s))   Comprehensive metabolic panel    Collection Time: 11/04/23  1:07 PM   Result Value Ref Range    Sodium Level 136 136 - 145 mmol/L    Potassium Level 3.5 3.5 - 5.1 mmol/L    Chloride 104 98 - 107 mmol/L    Carbon Dioxide 23 22 - 29 mmol/L    Glucose Level 80 74 - 100 mg/dL    Blood Urea Nitrogen 9.1 7.0 - 18.7 mg/dL    Creatinine 0.61 0.55 - 1.02 mg/dL    Calcium Level Total 10.3 (H) 8.4 - 10.2 mg/dL    Protein Total 7.1 6.4 - 8.3 gm/dL    Albumin Level 3.5 3.5 - 5.0 g/dL    Globulin 3.6 (H) 2.4 - 3.5 gm/dL    Albumin/Globulin Ratio 1.0 (L) 1.1 - 2.0 ratio    Bilirubin Total 0.2 <=1.5 mg/dL    Alkaline Phosphatase 47 40 - 150 unit/L    Alanine Aminotransferase 11 0 - 55 unit/L    Aspartate Aminotransferase 17 5 - 34 unit/L    eGFR >60 mls/min/1.73/m2   Magnesium    Collection Time: 11/04/23  1:07 PM   Result Value Ref Range    Magnesium Level 1.40 (L) 1.60 - 2.60 mg/dL   CBC with Differential    Collection Time: 11/04/23  1:07 PM   Result Value Ref Range    WBC 12.38 (H) 4.50 - 11.50 x10(3)/mcL    RBC 3.32 (L) 4.20 - 5.40 x10(6)/mcL    Hgb 10.7 (L) 12.0 - 16.0 g/dL    Hct 30.5 (L) 37.0 - 47.0 %    MCV 91.9 80.0 - 94.0 fL    MCH 32.2 (H) 27.0 - 31.0 pg    MCHC 35.1 33.0 - 36.0 g/dL    RDW 12.5 11.5 - 17.0 %    Platelet 184 130 - 400 x10(3)/mcL    MPV 8.8 7.4 - 10.4 fL    Neut % 73.2 %    Lymph % 16.4 %    Mono % 5.0 %    Eos % 0.7 %    Basophil % 0.3 %    Lymph # 2.03 0.6 - 4.6 x10(3)/mcL    Neut # 9.06 2.1 - 9.2 x10(3)/mcL    Mono # 0.62 0.1 - 1.3 x10(3)/mcL    Eos # 0.09 0 - 0.9 x10(3)/mcL    Baso # 0.04 <=0.2 x10(3)/mcL    IG# 0.54 (H) 0 - 0.04 x10(3)/mcL    IG% 4.4 %    NRBC% 0.0 %   Troponin I    Collection Time: 11/04/23  1:07 PM    Result Value Ref Range    Troponin-I <0.010 0.000 - 0.045 ng/mL   POCT glucose    Collection Time: 23  1:22 PM   Result Value Ref Range    POCT Glucose 75 70 - 110 mg/dL   Urinalysis, Reflex to Urine Culture    Collection Time: 23  1:30 PM    Specimen: Urine   Result Value Ref Range    Color, UA Yellow Yellow, Light-Yellow, Straw, Dark-Yellow    Appearance, UA Clear Clear    Specific Gravity, UA 1.015 1.005 - 1.030    pH, UA 7.0 5.0 - 8.5    Protein, UA Trace (A) Negative    Glucose, UA Trace (A) Negative, Normal    Ketones, UA Negative Negative    Blood, UA Negative Negative    Bilirubin, UA Negative Negative    Urobilinogen, UA Normal 0.2, 1.0, Normal    Nitrites, UA Negative Negative    Leukocyte Esterase, UA Negative Negative    WBC, UA 6-10 (A) None Seen, 0-2, 3-5, 0-5 /HPF    Bacteria, UA Trace None Seen, Trace /HPF    Squamous Epithelial Cells, UA Trace None Seen /HPF    Mucous, UA Trace (A) None Seen /LPF    Hyaline Casts, UA 0-2 (A) None Seen /lpf    RBC, UA 0-5 None Seen, 0-2, 3-5, 0-5 /HPF       Imaging Results    None          ASSESMENT and clinical impression: Bennie Escamilla is a 28 y.o.   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 Pt was also instructed to drink copious water. Patient voiced understanding of all these instructions and was subsequently  discharged home. Tylenol use and maternity belt use discussed. All questions answered. Pt left [facility] with good understanding of plan.   Preeclampsia/ROM/labor/fever/decreased FM with [facility] precautions discussed, voiced understanding     Advised on small but frequent meals, adding more protein to diet. Continue prenatal vitamins daily.     She will follow up with her primary OB as scheduled    Maite Shane NP  4:27 PM 11/04/2023

## 2023-11-04 NOTE — ED TRIAGE NOTES
IUP 25.3 syncopal episode that occurred at [facility]. Pt states +FM. Denies VB or LOF. Efm and toco applied. Vitals taken. Pt transferred to [facility] after being cleared in ER.

## 2023-11-04 NOTE — DISCHARGE INSTRUCTIONS
Keep scheduled appointments with [facility].    Call or return with any concerns or needs.

## 2023-11-14 ENCOUNTER — OFFICE VISIT (OUTPATIENT)
Dept: FAMILY MEDICINE | Facility: CLINIC | Age: 28
End: 2023-11-14
Payer: MEDICAID

## 2023-11-14 VITALS
TEMPERATURE: 98 F | BODY MASS INDEX: 20.77 KG/M2 | HEIGHT: 61 IN | WEIGHT: 110 LBS | HEART RATE: 95 BPM | OXYGEN SATURATION: 98 % | RESPIRATION RATE: 18 BRPM | DIASTOLIC BLOOD PRESSURE: 73 MMHG | SYSTOLIC BLOOD PRESSURE: 105 MMHG

## 2023-11-14 DIAGNOSIS — R55 SYNCOPE, UNSPECIFIED SYNCOPE TYPE: ICD-10-CM

## 2023-11-14 DIAGNOSIS — E83.42 HYPOMAGNESEMIA: ICD-10-CM

## 2023-11-14 DIAGNOSIS — Z3A.26 26 WEEKS GESTATION OF PREGNANCY: Primary | ICD-10-CM

## 2023-11-14 LAB
ALBUMIN SERPL-MCNC: 3.6 G/DL (ref 3.5–5)
ALBUMIN/GLOB SERPL: 0.9 RATIO (ref 1.1–2)
ALP SERPL-CCNC: 55 UNIT/L (ref 40–150)
ALT SERPL-CCNC: 15 UNIT/L (ref 0–55)
AST SERPL-CCNC: 15 UNIT/L (ref 5–34)
BILIRUB SERPL-MCNC: 0.1 MG/DL
BILIRUB SERPL-MCNC: NEGATIVE MG/DL
BLOOD URINE, POC: NEGATIVE
BUN SERPL-MCNC: 8 MG/DL (ref 7–18.7)
CALCIUM SERPL-MCNC: 9.8 MG/DL (ref 8.4–10.2)
CHLORIDE SERPL-SCNC: 105 MMOL/L (ref 98–107)
CLARITY, POC UA: CLEAR
CO2 SERPL-SCNC: 21 MMOL/L (ref 22–29)
COLOR, POC UA: NORMAL
CREAT SERPL-MCNC: 0.62 MG/DL (ref 0.55–1.02)
GFR SERPLBLD CREATININE-BSD FMLA CKD-EPI: >60 MLS/MIN/1.73/M2
GLOBULIN SER-MCNC: 4.2 GM/DL (ref 2.4–3.5)
GLUCOSE SERPL-MCNC: 104 MG/DL (ref 74–100)
GLUCOSE UR QL STRIP: NEGATIVE
KETONES UR QL STRIP: NEGATIVE
LEUKOCYTE ESTERASE URINE, POC: NEGATIVE
MAGNESIUM SERPL-MCNC: 1.4 MG/DL (ref 1.6–2.6)
NITRITE, POC UA: NEGATIVE
PH, POC UA: 7
POTASSIUM SERPL-SCNC: 3.6 MMOL/L (ref 3.5–5.1)
PROT SERPL-MCNC: 7.8 GM/DL (ref 6.4–8.3)
PROTEIN, POC: NEGATIVE
SODIUM SERPL-SCNC: 137 MMOL/L (ref 136–145)
SPECIFIC GRAVITY, POC UA: 1.01
UROBILINOGEN, POC UA: NORMAL

## 2023-11-14 PROCEDURE — 83735 ASSAY OF MAGNESIUM: CPT | Performed by: STUDENT IN AN ORGANIZED HEALTH CARE EDUCATION/TRAINING PROGRAM

## 2023-11-14 PROCEDURE — 99214 OFFICE O/P EST MOD 30 MIN: CPT | Mod: PBBFAC | Performed by: STUDENT IN AN ORGANIZED HEALTH CARE EDUCATION/TRAINING PROGRAM

## 2023-11-14 PROCEDURE — 80053 COMPREHEN METABOLIC PANEL: CPT | Performed by: STUDENT IN AN ORGANIZED HEALTH CARE EDUCATION/TRAINING PROGRAM

## 2023-11-14 PROCEDURE — 81002 URINALYSIS NONAUTO W/O SCOPE: CPT | Mod: PBBFAC | Performed by: STUDENT IN AN ORGANIZED HEALTH CARE EDUCATION/TRAINING PROGRAM

## 2023-11-14 PROCEDURE — 36415 COLL VENOUS BLD VENIPUNCTURE: CPT | Performed by: STUDENT IN AN ORGANIZED HEALTH CARE EDUCATION/TRAINING PROGRAM

## 2023-11-14 NOTE — PROGRESS NOTES
[facility]MOB Clinic Note    Subjective:      Chief Complaint: Routine Prenatal Visit (26w6d)      Patient ID: Bennie Escamilla is a 28 y.o. yo  at 26^6WGA by US. (PATRICE: 2024).  PCP: Rosario, Primary Doctor     : [id number] used for entire duration of visit    Current Concerns:   Syncope last week while shopping at [employer] - evaluated in ED - glucose was 75, EKG normal. CMP normal. Mg 1.4. Was given 1g IV prior to dc home with ED precautions.     Knee soreness when sleeping. Started 1 month ago, about 4-5x weekly. Mostly at night, does not occur during the day. No tenderness or swelling. Soaking feet/legs in water makes it better better    Chronic Issues:   H/o gDM: diet controlled    Gestational History:  (date, GA, length labor, BW, sex, type, anes, place, complications)    OB History    Para Term  AB Living   2 1 1 0 0 1   SAB IAB Ectopic Multiple Live Births   0 0 0 0 1      # Outcome Date GA Lbr Dedrick/2nd Weight Sex Delivery Anes PTL Lv   2 Current            1 Term 21   3.062 kg (6 lb 12 oz) F Vag-Spont  N NEHEMIAS       Gyn History:   - LMP: 23 (exact date)  - Menarche: 12 years old  - Menstrual Hx: 28 day intervals  - Hx of birth control: none  - Hx of STDs: denies  - History of Abnormal PAP: LSIL . Repeat  NIL but with epithelial cell abnormality. Repeat 2023 NIL.    Antepartum specific ROS  - Fetal movements: yes  - Vaginal bleeding: no  - Vaginal discharge: yes, leukorrhea, recently become less, no burning, itching, pain  - Loss of fluid: no  - Contractions: no  - Headaches: no, some stuffy feeling  - Vision changes: no  - Edema: no    General ROS  Constitutional: no fever, no chills, no weight loss  CV: no swelling, no edema, no chest pain  : no urinary retention, no urinary incontinence, denies dysuria  GI: no fecal incontinence, no constipation, no diarrhea, no nausea, no vomiting  RESP: no SOB, no wheezing, no difficulty breathing  Psych: no depression, no anxiety  "    Objective:      /73 (BP Location: Right arm, Patient Position: Sitting, BP Method: Medium (Automatic))   Pulse 95   Temp 98.4 °F (36.9 °C) (Oral)   Resp 18   Ht 5' 1" (1.549 m)   Wt 49.9 kg (110 lb)   LMP 04/26/2023 (Approximate)   SpO2 98%   Breastfeeding Unknown   BMI 20.78 kg/m²   Physical Exam:  Gen: alert and oriented, NAD  Resp: CTA bilaterally, nonlabored breathing  CV: RRR, no murmurs, no edema  Abd: gravid, nontender, +BS  - FHTs: 152 bpm  - Fundal height: 24.5 cm    Ultrasound:  10/25/23  A viable medina pregnancy is visualized in cephalic presentation. Estimated fetal weight is at the 19th percentile with an abdominal circumference at the 15th percentile.   No fetal abnormalities are noted and previous anatomic survey was normal. Amniotic fluid volume is normal. Placenta is anterior. Rec'd repeat in 4wks    Initial OB Labs 8/3/2023  - Blood Type and Rh: O+  - Antibody Screen: Neg  - CBC H/H: 12.7/37.2  - HIV: NR  - RPR: NR  - GC: not detected  - CT: Not detected  - HBsAg: NR  - HCVAb: NR  - Rubella: immune  - Varicella: Immune  - UA & Culture: +GBS ( 25,000-50,000 colonies); repeat urine Cx confirmed  - Sickle Cell Screen: neg  - PAP: NIL   - Influenza vaccine date: 10/10/23    15-20 Weeks Lab 8/29/23  - Quad Screen: no increased risk      28 Week Lab:  - 1H GTT: done early at 16wga per [facility] recs - 116  - Rhogam:   - Date of Tdap:   - CBC H/H:   - Syphilis Ab:   - HIV:     36 Week Lab:  - CBC H/H:   - Syphilis Ab:   - HIV:   - GBS Culture:   - Cervical GC:     Current Outpatient Medications   Medication Instructions    PNV,calcium 72-iron-folic acid (PRENATAL VITAMIN PLUS LOW IRON) 27 mg iron- 1 mg Tab 1 each, Oral, Daily       Lab Results   Component Value Date    COLORU Light Yellow 11/14/2023    SPECGRAV 1.015 11/14/2023    PHUR 7.0 11/14/2023    WBCUR Negative 11/14/2023    NITRITE Negative 11/14/2023    PROTEINPOC Negative 11/14/2023    GLUCOSEUR Negative 11/14/2023    KETONESU " Negative 11/14/2023    UROBILINOGEN 0.2 E.U./dL 11/14/2023    BILIRUBINPOC Negative 11/14/2023    RBCUR Negative 11/14/2023        Assessment/Plan:     1. 26 weeks gestation of pregnancy  POCT URINE DIPSTICK WITHOUT MICROSCOPE      2. Syncope, unspecified syncope type  Comprehensive Metabolic Panel    Magnesium    Magnesium    Comprehensive Metabolic Panel      3. Hypomagnesemia          - repeat CMP and Mg due to syncope and hypomagnesemia.   - syncopal episode possible 2/2 borderline hypoglycemia vs hypomagnesemia    - OB protocol  - cont PNVs  - Urine Dipstick reviewed: wnl  - Routine labs reviewed  - Strict labor and ED precautions discussed in depth: Fever, vaginal bleeding or leaking fluid, belly cramping or pain, shortness of breath, chest pain, swelling of the face, hands, ankles, feet, or leg; decreased fetal movement, changes in vision, severe headache that does not resolve with rest or Tylenol.    RTC 2 wks for 28wk visit

## 2023-11-16 ENCOUNTER — TELEPHONE (OUTPATIENT)
Dept: FAMILY MEDICINE | Facility: CLINIC | Age: 28
End: 2023-11-16
Payer: MEDICAID

## 2023-11-17 NOTE — TELEPHONE ENCOUNTER
DOS 11/16/23  Language line contacted for  to call pt regarding lab results, no  available despite wait. Pt with appt 11/29/23. Will f/u at time of appt

## 2023-11-20 ENCOUNTER — PATIENT OUTREACH (OUTPATIENT)
Dept: FAMILY MEDICINE | Facility: CLINIC | Age: 28
End: 2023-11-20
Payer: MEDICAID

## 2023-11-20 DIAGNOSIS — Z36.89 ENCOUNTER FOR FETAL ANATOMIC SURVEY: Primary | ICD-10-CM

## 2023-11-21 NOTE — PROGRESS NOTES
Follow-up: 12/19/2023    Appointment reminder given for [facility] 11/22/2023 1400 for ultrasound.  Patient verbalized understanding.        Patient verbalized understanding: Yes        Other comments: Spoke with patient via [employer]  [place] [id number].  Identity verified.  Pt states she is doing well and has not had any syncopal episodes since her ED visit 11/4/23.  Patient denies abdominal pain, vaginal bleeding or leaking fluid, CP, SOB, edema, HA, visual changes, contractions, but endorses positive fetal movement.  Instructed patient if she has any of the above symptoms or the baby doesn't move in over an hour to report to the ED for evaluation.  Pt had an ultrasound with [facility] 10/25/23 and an appt with [facility] 11/14/23, CPM RTC 2 weeks.  Patient denies any SDOH barriers at this time. Stressed the importance of medication compliance, eating a healthy diet, drinking plenty of water and keeping appointments.  Patient verbalized understanding to all instructions.                       Education given on  See above

## 2023-11-22 ENCOUNTER — PROCEDURE VISIT (OUTPATIENT)
Dept: MATERNAL FETAL MEDICINE | Facility: CLINIC | Age: 28
End: 2023-11-22
Payer: MEDICAID

## 2023-11-22 DIAGNOSIS — Z36.89 ENCOUNTER FOR FETAL ANATOMIC SURVEY: ICD-10-CM

## 2023-11-22 PROCEDURE — 76816 US MFM PROCEDURE (VIEWPOINT): ICD-10-PCS | Mod: S$GLB,,, | Performed by: OBSTETRICS & GYNECOLOGY

## 2023-11-22 PROCEDURE — 76816 OB US FOLLOW-UP PER FETUS: CPT | Mod: S$GLB,,, | Performed by: OBSTETRICS & GYNECOLOGY

## 2023-11-29 ENCOUNTER — OFFICE VISIT (OUTPATIENT)
Dept: FAMILY MEDICINE | Facility: CLINIC | Age: 28
End: 2023-11-29
Payer: MEDICAID

## 2023-11-29 VITALS
HEIGHT: 61 IN | HEART RATE: 98 BPM | DIASTOLIC BLOOD PRESSURE: 69 MMHG | RESPIRATION RATE: 18 BRPM | OXYGEN SATURATION: 98 % | TEMPERATURE: 98 F | BODY MASS INDEX: 21.75 KG/M2 | WEIGHT: 115.19 LBS | SYSTOLIC BLOOD PRESSURE: 101 MMHG

## 2023-11-29 DIAGNOSIS — Z34.90 PREGNANCY, UNSPECIFIED GESTATIONAL AGE: Primary | ICD-10-CM

## 2023-11-29 DIAGNOSIS — E83.42 HYPOMAGNESEMIA: ICD-10-CM

## 2023-11-29 LAB
ABS NEUT CALC (OHS): 8.27 X10(3)/MCL (ref 2.1–9.2)
BILIRUB SERPL-MCNC: NORMAL MG/DL
BLOOD URINE, POC: NORMAL
CLARITY, POC UA: CLEAR
COLOR, POC UA: YELLOW
EOSINOPHIL NFR BLD MANUAL: 0.1 X10(3)/MCL (ref 0–0.9)
EOSINOPHIL NFR BLD MANUAL: 1 % (ref 0–8)
ERYTHROCYTE [DISTWIDTH] IN BLOOD BY AUTOMATED COUNT: 12.6 % (ref 11.5–17)
GLUCOSE UR QL STRIP: NORMAL
HCT VFR BLD AUTO: 33.1 % (ref 37–47)
HGB BLD-MCNC: 11.3 G/DL (ref 12–16)
HIV 1+2 AB+HIV1 P24 AG SERPL QL IA: NONREACTIVE
KETONES UR QL STRIP: NORMAL
LEUKOCYTE ESTERASE URINE, POC: NORMAL
LYMPHOCYTES NFR BLD MANUAL: 1.38 X10(3)/MCL
LYMPHOCYTES NFR BLD MANUAL: 14 % (ref 13–40)
MCH RBC QN AUTO: 31.9 PG (ref 27–31)
MCHC RBC AUTO-ENTMCNC: 34.1 G/DL (ref 33–36)
MCV RBC AUTO: 93.5 FL (ref 80–94)
MYELOCYTES NFR BLD MANUAL: 1 %
NEUTROPHILS NFR BLD MANUAL: 78 % (ref 47–80)
NEUTS BAND NFR BLD MANUAL: 6 % (ref 0–11)
NITRITE, POC UA: NORMAL
NRBC BLD AUTO-RTO: 0 %
PH, POC UA: 7
PLATELET # BLD AUTO: 215 X10(3)/MCL (ref 130–400)
PLATELET # BLD EST: ADEQUATE 10*3/UL
PMV BLD AUTO: 9 FL (ref 7.4–10.4)
PROTEIN, POC: NORMAL
RBC # BLD AUTO: 3.54 X10(6)/MCL (ref 4.2–5.4)
RBC MORPH BLD: NORMAL
SPECIFIC GRAVITY, POC UA: 1.01
T PALLIDUM AB SER QL: NONREACTIVE
UROBILINOGEN, POC UA: 0.2
WBC # SPEC AUTO: 9.85 X10(3)/MCL (ref 4.5–11.5)

## 2023-11-29 PROCEDURE — 90715 TDAP VACCINE 7 YRS/> IM: CPT | Mod: PBBFAC

## 2023-11-29 PROCEDURE — 81002 URINALYSIS NONAUTO W/O SCOPE: CPT | Mod: PBBFAC | Performed by: STUDENT IN AN ORGANIZED HEALTH CARE EDUCATION/TRAINING PROGRAM

## 2023-11-29 PROCEDURE — 90471 IMMUNIZATION ADMIN: CPT | Mod: PBBFAC

## 2023-11-29 PROCEDURE — 99213 OFFICE O/P EST LOW 20 MIN: CPT | Mod: PBBFAC | Performed by: STUDENT IN AN ORGANIZED HEALTH CARE EDUCATION/TRAINING PROGRAM

## 2023-11-29 PROCEDURE — 85027 COMPLETE CBC AUTOMATED: CPT | Performed by: STUDENT IN AN ORGANIZED HEALTH CARE EDUCATION/TRAINING PROGRAM

## 2023-11-29 PROCEDURE — 36415 COLL VENOUS BLD VENIPUNCTURE: CPT | Performed by: STUDENT IN AN ORGANIZED HEALTH CARE EDUCATION/TRAINING PROGRAM

## 2023-11-29 PROCEDURE — 87389 HIV-1 AG W/HIV-1&-2 AB AG IA: CPT | Performed by: STUDENT IN AN ORGANIZED HEALTH CARE EDUCATION/TRAINING PROGRAM

## 2023-11-29 PROCEDURE — 86780 TREPONEMA PALLIDUM: CPT | Performed by: STUDENT IN AN ORGANIZED HEALTH CARE EDUCATION/TRAINING PROGRAM

## 2023-11-29 RX ORDER — LANOLIN ALCOHOL/MO/W.PET/CERES
400 CREAM (GRAM) TOPICAL DAILY
Qty: 30 TABLET | Refills: 1 | Status: SHIPPED | OUTPATIENT
Start: 2023-11-29 | End: 2024-01-30 | Stop reason: SDUPTHER

## 2023-11-29 RX ADMIN — TETANUS TOXOID, REDUCED DIPHTHERIA TOXOID AND ACELLULAR PERTUSSIS VACCINE, ADSORBED 0.5 ML: 5; 2.5; 8; 8; 2.5 SUSPENSION INTRAMUSCULAR at 09:11

## 2023-11-29 NOTE — PROGRESS NOTES
"[facility] FMOB Clinic Note    Subjective:      Chief Complaint: Routine Prenatal Visit (OB 29 weeks 0 days)      Patient ID: Bennie Escamilla is a 28 y.o. yo  at 29^0WGA by US. (PATRICE: 2024).  PCP: Rosario, Primary Doctor     : [id number] used for entire duration of visit    Current Concerns:   Syncope - no recurrent episodes since last visit.     Chronic Issues:   H/o gDM: diet controlled. Not in current pregnancy.    Gestational History:  (date, GA, length labor, BW, sex, type, anes, place, complications)    OB History    Para Term  AB Living   2 1 1 0 0 1   SAB IAB Ectopic Multiple Live Births   0 0 0 0 1      # Outcome Date GA Lbr Dedrick/2nd Weight Sex Delivery Anes PTL Lv   2 Current            1 Term 21   3.062 kg (6 lb 12 oz) F Vag-Spont  N NEHEMIAS       Gyn History:   - LMP: 23 (exact date)  - Menarche: 12 years old  - Menstrual Hx: 28 day intervals  - Hx of birth control: none  - Hx of STDs: denies  - History of Abnormal PAP: LSIL . Repeat  NIL but with epithelial cell abnormality. Repeat 2023 NIL.    Antepartum specific ROS  - Fetal movements: yes  - Vaginal bleeding: no  - Vaginal discharge: little bit, little less volume. Otherwise looks the same. No vaginal pain or itch  - Loss of fluid: no  - Contractions: irregular - feels abd become hard mostly at night time for few minutes, then resolve  - Headaches: no  - Vision changes: no  - Edema: no    General ROS  Constitutional: no fever, no chills, no weight loss.  CV: no swelling, no edema, no chest pain.  : no urinary retention, no urinary incontinence, denies dysuria  GI: no fecal incontinence, no constipation, no diarrhea, no nausea, no vomiting  RESP: no SOB, no wheezing, no difficulty breathing.  Psych: no depression, no anxiety     Objective:      /69 (BP Location: Right arm, Patient Position: Sitting, BP Method: Large (Automatic))   Pulse 98   Temp 98.2 °F (36.8 °C) (Oral)   Resp 18   Ht 5' 1" (1.549 m)   Wt " 52.3 kg (115 lb 3.2 oz)   LMP 04/26/2023 (Approximate)   SpO2 98%   BMI 21.77 kg/m²   Physical Exam:  Gen: alert and oriented, NAD  Resp: CTA bilaterally, nonlabored breathing  CV: RRR, no murmurs, no edema  Abd: gravid, nontender, +BS  - FHTs: 153-155 bpm  - Fundal height: 29 cm    Ultrasound:  10/25/23  A viable medina pregnancy is visualized in cephalic presentation. Estimated fetal weight is at the 19th percentile with an abdominal circumference at the 15th percentile.   No fetal abnormalities are noted and previous anatomic survey was normal. Amniotic fluid volume is normal. Placenta is anterior. Rec'd repeat in 4wks    Initial OB Labs 8/3/2023  - Blood Type and Rh: O+  - Antibody Screen: Neg  - CBC H/H: 12.7/37.2  - HIV: NR  - RPR: NR  - GC: not detected  - CT: Not detected  - HBsAg: NR  - HCVAb: NR  - Rubella: immune  - Varicella: Immune  - UA & Culture: +GBS ( 25,000-50,000 colonies); repeat urine Cx confirmed  - Sickle Cell Screen: neg  - PAP: NIL   - Influenza vaccine date: 10/10/23    15-20 Weeks Lab 8/29/23  - Quad Screen: no increased risk      28 Week Lab: remainder obtained today  - 1H GTT: done early at 16wga per [facility] recs - 116  - Rhogam: n/a  - Date of Tdap: 11/29/23  - CBC H/H:   - Syphilis Ab:   - HIV:     36 Week Lab:  - CBC H/H:   - Syphilis Ab:   - HIV:   - GBS Culture:   - Cervical GC:     Current Outpatient Medications   Medication Instructions    magnesium oxide (MAG-OX) 400 mg, Oral, Daily    PNV,calcium 72-iron-folic acid (PRENATAL VITAMIN PLUS LOW IRON) 27 mg iron- 1 mg Tab 1 each, Oral, Daily       Lab Results   Component Value Date    COLORU Yellow 11/29/2023    SPECGRAV 1.015 11/29/2023    PHUR 7.0 11/29/2023    WBCUR neg 11/29/2023    NITRITE neg 11/29/2023    PROTEINPOC neg 11/29/2023    GLUCOSEUR neg 11/29/2023    KETONESU neg 11/29/2023    UROBILINOGEN 0.2 11/29/2023    BILIRUBINPOC neg 11/29/2023    RBCUR neg 11/29/2023        Assessment/Plan:     1. Pregnancy, unspecified  gestational age    2. Hypomagnesemia      Orders Placed This Encounter    CBC Auto Differential    SYPHILIS ANTIBODY (WITH REFLEX RPR)    HIV 1/2 Ag/Ab (4th Gen)    CBC with Differential    Manual Differential    POCT URINE DIPSTICK WITHOUT MICROSCOPE    magnesium oxide (MAG-OX) 400 mg (241.3 mg magnesium) tablet    Tdap (BOOSTRIX) vaccine injection 0.5 mL      - OB protocol  - cont PNVs  - Urine Dipstick reviewed: wnl  - Routine labs reviewed  - Strict labor and ED precautions discussed in depth: Fever, vaginal bleeding or leaking fluid, belly cramping or pain, shortness of breath, chest pain, swelling of the face, hands, ankles, feet, or leg; decreased fetal movement, changes in vision, severe headache that does not resolve with rest or Tylenol.    - rx for mag repletion sent    RTC 2 wks for 31wk visit

## 2023-12-01 NOTE — PROGRESS NOTES
Patient was discussed with the resident on the DOS.   Services were provided at a teaching facility.   I was immediately available during the encounter.   I have reviewed the resident's note.   The plan and management are reasonable and appropriate.

## 2023-12-08 ENCOUNTER — OFFICE VISIT (OUTPATIENT)
Dept: FAMILY MEDICINE | Facility: CLINIC | Age: 28
End: 2023-12-08
Payer: MEDICAID

## 2023-12-08 VITALS
HEART RATE: 121 BPM | WEIGHT: 116 LBS | HEIGHT: 61 IN | DIASTOLIC BLOOD PRESSURE: 72 MMHG | BODY MASS INDEX: 21.9 KG/M2 | RESPIRATION RATE: 18 BRPM | OXYGEN SATURATION: 97 % | TEMPERATURE: 99 F | SYSTOLIC BLOOD PRESSURE: 104 MMHG

## 2023-12-08 DIAGNOSIS — Z3A.30 30 WEEKS GESTATION OF PREGNANCY: Primary | ICD-10-CM

## 2023-12-08 LAB
BILIRUB SERPL-MCNC: NEGATIVE MG/DL
BLOOD URINE, POC: ABNORMAL
CLARITY, POC UA: CLEAR
COLOR, POC UA: YELLOW
GLUCOSE UR QL STRIP: NEGATIVE
KETONES UR QL STRIP: NEGATIVE
LEUKOCYTE ESTERASE URINE, POC: NEGATIVE
NITRITE, POC UA: NEGATIVE
PH, POC UA: 7.5
PROTEIN, POC: NEGATIVE
SPECIFIC GRAVITY, POC UA: 1.02
UROBILINOGEN, POC UA: 0.2

## 2023-12-08 PROCEDURE — 81002 URINALYSIS NONAUTO W/O SCOPE: CPT | Mod: PBBFAC

## 2023-12-08 PROCEDURE — 99214 OFFICE O/P EST MOD 30 MIN: CPT | Mod: PBBFAC

## 2023-12-08 NOTE — PROGRESS NOTES
"[facility] Clinic Note    Subjective:      Chief Complaint: Routine Prenatal Visit (Patient states baby is very low in the stomach for the past few days. Feeling pain close to the vaginal area.)    Patient ID: Bennie Escamilla is a 28 y.o. yo  at 30^2WGA by US. (PATRICE: 2024).  PCP: Rosario, Primary Doctor     : [id number] used for entire duration of visit    Current Concerns:   Pt presents to clinic today with an acute concern that she feels baby is starting to move too low. For the past 2 days has been feeling baby move in her lower abdomen instead of her upper. Denied any recent fluid loss, vaginal bleeding or contraction like pains. Baby is moving frequently.      Chronic Issues:   H/o gDM: diet controlled. Not in current pregnancy.    Gestational History:  OB History    Para Term  AB Living   2 1 1 0 0 1   SAB IAB Ectopic Multiple Live Births   0 0 0 0 1      # Outcome Date GA Lbr Dedrick/2nd Weight Sex Delivery Anes PTL Lv   2 Current            1 Term 21   3.062 kg (6 lb 12 oz) F Vag-Spont  N NEHEMIAS     Gyn History:   - LMP: 23 (exact date)  - Menarche: 12 years old  - Menstrual Hx: 28 day intervals  - Hx of birth control: none  - Hx of STDs: denies  - History of Abnormal PAP: LSIL . Repeat  NIL but with epithelial cell abnormality. Repeat 2023 NIL.    Antepartum specific ROS  - Fetal movements: yes  - Vaginal bleeding: no  - Vaginal discharge: little bit, little less volume. Otherwise looks the same. No vaginal pain or itch  - Loss of fluid: no  - Contractions: irregular - feels abd become hard mostly at night time for few minutes, then resolve  - Headaches: no  - Vision changes: no  - Edema: no    Objective:      /72 (BP Location: Right arm, Patient Position: Sitting, BP Method: Small (Automatic))   Pulse (!) 121   Temp 98.7 °F (37.1 °C) (Oral)   Resp 18   Ht 5' 1" (1.549 m)   Wt 52.6 kg (116 lb)   LMP 2023 (Approximate)   SpO2 97%   BMI 21.92 kg/m² "     Physical Exam:  Gen: alert and oriented, NAD  Resp: CTA bilaterally, nonlabored breathing  CV: RRR, no murmurs, no edema  Abd: gravid, nontender, +BS  - FHTs: 150s bpm  - Fundal height: 31 cm    Ultrasound:  10/25/23  A viable medina pregnancy is visualized in cephalic presentation. Estimated fetal weight is at the 19th percentile with an abdominal circumference at the 15th percentile.   No fetal abnormalities are noted and previous anatomic survey was normal. Amniotic fluid volume is normal. Placenta is anterior. Rec'd repeat in 4wks    Initial OB Labs 8/3/2023  - Blood Type and Rh: O+  - Antibody Screen: Neg  - CBC H/H: 12.7/37.2  - HIV: NR  - RPR: NR  - GC: not detected  - CT: Not detected  - HBsAg: NR  - HCVAb: NR  - Rubella: immune  - Varicella: Immune  - UA & Culture: +GBS ( 25,000-50,000 colonies); repeat urine Cx confirmed  - Sickle Cell Screen: neg  - PAP: NIL   - Influenza vaccine date: 10/10/23    15-20 Weeks Lab 8/29/23  - Quad Screen: no increased risk      28 Week Lab: 11/29/23  - 1H GTT: done early at 16wga per [facility] recs - 116  - Rhogam: n/a  - Date of Tdap: 11/29/23  - CBC H/H: 11.3/33.1  - Syphilis Ab: NR  - HIV: HR    36 Week Lab:  - CBC H/H:   - Syphilis Ab:   - HIV:   - GBS Culture:   - Cervical GC:     Current Outpatient Medications   Medication Instructions    magnesium oxide (MAG-OX) 400 mg, Oral, Daily    PNV,calcium 72-iron-folic acid (PRENATAL VITAMIN PLUS LOW IRON) 27 mg iron- 1 mg Tab 1 each, Oral, Daily       Lab Results   Component Value Date    COLORU Yellow 12/08/2023    SPECGRAV 1.020 12/08/2023    PHUR 7.5 12/08/2023    WBCUR Negative 12/08/2023    NITRITE Negative 12/08/2023    PROTEINPOC Negative 12/08/2023    GLUCOSEUR Negative 12/08/2023    KETONESU Negative 12/08/2023    UROBILINOGEN 0.2 12/08/2023    BILIRUBINPOC Negative 12/08/2023    RBCUR Trace 12/08/2023        Assessment/Plan:     1. 30 weeks gestation of pregnancy      Orders Placed This Encounter    POCT URINE  DIPSTICK WITHOUT MICROSCOPE      - OB protocol  - cont PNVs  - Urine Dipstick reviewed: wnl  - Routine labs reviewed  - Strict labor and ED precautions discussed in depth: Fever, vaginal bleeding or leaking fluid, belly cramping or pain, shortness of breath, chest pain, swelling of the face, hands, ankles, feet, or leg; decreased fetal movement, changes in vision, severe headache that does not resolve with rest or Tylenol.  -Given reassuring fetal heart tones, no loss of fluids or contraction pains present low suspicion present for  delivery at this time. Pt instructed if symptoms worsen or she has loss of fluid or contraction pains to present to [facility] OB ED for further evaluation; she expressed understanding.      RTC 2 wks for 33wk visit    Maykel Hernandez MD  [facility]

## 2023-12-09 NOTE — PROGRESS NOTES
Discussed with resident at time of encounter 12-08-23.  IUP @ 31 weeks.  Sensation of position change of fetus.  No contractions / vaginal bleeding.    Resident's note reviewed 12-09-23.  Antepartum ROS (11-29-23) noted.  Agree with assessment; plan of care appropriate.  Professional services provided in an outpatient primary care center affiliated with a [facility] institution.

## 2023-12-19 ENCOUNTER — PATIENT OUTREACH (OUTPATIENT)
Dept: FAMILY MEDICINE | Facility: CLINIC | Age: 28
End: 2023-12-19
Payer: MEDICAID

## 2023-12-19 ENCOUNTER — OFFICE VISIT (OUTPATIENT)
Dept: FAMILY MEDICINE | Facility: CLINIC | Age: 28
End: 2023-12-19
Payer: MEDICAID

## 2023-12-19 VITALS
RESPIRATION RATE: 17 BRPM | HEART RATE: 89 BPM | TEMPERATURE: 98 F | SYSTOLIC BLOOD PRESSURE: 99 MMHG | OXYGEN SATURATION: 97 % | DIASTOLIC BLOOD PRESSURE: 62 MMHG | HEIGHT: 61 IN | WEIGHT: 119 LBS | BODY MASS INDEX: 22.47 KG/M2

## 2023-12-19 DIAGNOSIS — Z3A.31 31 WEEKS GESTATION OF PREGNANCY: Primary | ICD-10-CM

## 2023-12-19 PROCEDURE — 99213 OFFICE O/P EST LOW 20 MIN: CPT | Mod: PBBFAC

## 2023-12-19 NOTE — PROGRESS NOTES
Faculty Attestation: Bennie Escamilla  was seen in Family Medicine Clinic. Discussed with resident at the time of the visit. History of Present Illness, Physical Exam, and Assessment and Plan reviewed. Treatment plan is reasonable and appropriate. Compliance with treatment recommendations is important.         Vane Baron MD  Sports Medicine

## 2023-12-19 NOTE — PROGRESS NOTES
Follow-up:    Appointment reminder given for [facility] 12/19/2023 1500.        Patient verbalized understanding.  Yes        Other comments: Spoke with patient via [employer]  MAGAN Spencer ( ID).  Identity verified.  Patient states she is doing well.  Patient denies abdominal pain, vaginal bleeding or leaking fluid, CP, SOB, edema, HA, visual changes, contractions, but endorses positive fetal movement.  Instructed patient if she has any of the above symptoms or the baby doesn't move in over an hour to report to the ED for evaluation.  Pt had an appt with [facility] 11/29/23 and magnesium supplement was prescribed.  Pt states she is taking the magnesium.  Patient denies any issues or concerns.  Patient denies any [facility] barriers at this time.  Stressed the importance of medication compliance, eating a healthy diet, drinking plenty of water and keeping appointments.  Informed patient that the OB Navigation Program would be ending and this would be the last call.  Instructed to contact [facility] for any issues.  Patient verbalized understanding to all instructions.       [place] [facility] Navigation Program.  Encounter closed.                        Education given on  See above

## 2023-12-19 NOTE — PATIENT INSTRUCTIONS
Magdaleno Avery,     If you are due for any health screening(s) below please notify me so we can arrange them to be ordered and scheduled. Most healthy patients at your age complete them, but you are free to accept or refuse.     If you can't do it, I'll definitely understand. If you can, I'd certainly appreciate it!    All of your core healthy metrics are met.

## 2023-12-19 NOTE — PROGRESS NOTES
"[facility] FMOB Clinic Note    Subjective:      Chief Complaint: Routine Prenatal Visit (31 weeks and 6 days) and Medication Refill (Magnesium Oxide)    Patient ID: Bennie Escamilla is a 28 y.o. yo  at 31^6 WGA by US. (PATRICE: 2024).  PCP: Rosario, Primary Doctor     : [id number] used for entire duration of visit    Current Concerns:     No unusual complaints.     Chronic Issues:   H/o gDM: diet controlled. Not in current pregnancy.    Gestational History:  OB History    Para Term  AB Living   2 1 1 0 0 1   SAB IAB Ectopic Multiple Live Births   0 0 0 0 1      # Outcome Date GA Lbr Dedrick/2nd Weight Sex Delivery Anes PTL Lv   2 Current            1 Term 21   3.062 kg (6 lb 12 oz) F Vag-Spont  N NEHEMIAS     Gyn History:   - LMP: 23 (exact date)  - Menarche: 12 years old  - Menstrual Hx: 28 day intervals  - Hx of birth control: none  - Hx of STDs: denies  - History of Abnormal PAP: LSIL . Repeat  NIL but with epithelial cell abnormality. Repeat 2023 NIL.    Antepartum specific ROS  - Fetal movements: yes  - Vaginal bleeding: no  - Vaginal discharge: no  - Loss of fluid: no  - Contractions: feels like tightening for about 10 seconds at night, happened only 2-3 times so far, then would resolve  - Headaches: no  - Vision changes: no  - Edema: no    Objective:      BP 99/62 (BP Location: Right arm, Patient Position: Sitting, BP Method: Medium (Automatic))   Pulse 89   Temp 98.2 °F (36.8 °C) (Oral)   Resp 17   Ht 5' 0.98" (1.549 m)   Wt 54 kg (119 lb)   LMP 2023 (Approximate)   SpO2 97%   BMI 22.50 kg/m²     Physical Exam:  Gen: alert and oriented, NAD  Resp: CTA bilaterally, nonlabored breathing  CV: RRR, no murmurs, no edema  Abd: gravid, nontender, +BS  - FHTs: 150s bpm  - Fundal height: 32 cm    Ultrasound:  10/25/23  A viable medina pregnancy is visualized in cephalic presentation. Estimated fetal weight is at the 19th percentile with an abdominal circumference at the 15th " percentile.   No fetal abnormalities are noted and previous anatomic survey was normal. Amniotic fluid volume is normal. Placenta is anterior. Rec'd repeat in 4wks    Initial OB Labs 8/3/2023  - Blood Type and Rh: O+  - Antibody Screen: Neg  - CBC H/H: 12.7/37.2  - HIV: NR  - RPR: NR  - GC: not detected  - CT: Not detected  - HBsAg: NR  - HCVAb: NR  - Rubella: immune  - Varicella: Immune  - UA & Culture: +GBS ( 25,000-50,000 colonies); repeat urine Cx confirmed  - Sickle Cell Screen: neg  - PAP: NIL   - Influenza vaccine date: 10/10/23    15-20 Weeks Lab 8/29/23  - Quad Screen: no increased risk      28 Week Lab: 11/29/23  - 1H GTT: done early at 16wga per [facility] recs - 116  - Rhogam: n/a  - Date of Tdap: 11/29/23  - CBC H/H: 11.3/33.1  - Syphilis Ab: NR  - HIV: HR    36 Week Lab:  - CBC H/H:   - Syphilis Ab:   - HIV:   - GBS Culture:   - Cervical GC:     Current Outpatient Medications   Medication Instructions    magnesium oxide (MAG-OX) 400 mg, Oral, Daily    PNV,calcium 72-iron-folic acid (PRENATAL VITAMIN PLUS LOW IRON) 27 mg iron- 1 mg Tab 1 each, Oral, Daily       Lab Results   Component Value Date    COLORU Yellow 12/08/2023    SPECGRAV 1.020 12/08/2023    PHUR 7.5 12/08/2023    WBCUR Negative 12/08/2023    NITRITE Negative 12/08/2023    PROTEINPOC Negative 12/08/2023    GLUCOSEUR Negative 12/08/2023    KETONESU Negative 12/08/2023    UROBILINOGEN 0.2 12/08/2023    BILIRUBINPOC Negative 12/08/2023    RBCUR Trace 12/08/2023        Assessment/Plan:     1. 31 weeks gestation of pregnancy      Orders Placed This Encounter    POCT URINE DIPSTICK WITHOUT MICROSCOPE      - OB protocol  - cont PNVs  - Urine Dipstick reviewed: wnl  - Routine labs reviewed  - Plans on formula feeding  - Patient declines contraception after delivery, also declines BTL  - Strict labor and ED precautions discussed in depth: Fever, vaginal bleeding or leaking fluid, belly cramping or pain, shortness of breath, chest pain, swelling of the  face, hands, ankles, feet, or leg; decreased fetal movement, changes in vision, severe headache that does not resolve with rest or Tylenol.    RTC in 2 weeks

## 2024-01-02 ENCOUNTER — OFFICE VISIT (OUTPATIENT)
Dept: FAMILY MEDICINE | Facility: CLINIC | Age: 29
End: 2024-01-02
Payer: MEDICAID

## 2024-01-02 VITALS
WEIGHT: 119.63 LBS | DIASTOLIC BLOOD PRESSURE: 70 MMHG | HEIGHT: 61 IN | TEMPERATURE: 97 F | RESPIRATION RATE: 18 BRPM | HEART RATE: 107 BPM | SYSTOLIC BLOOD PRESSURE: 104 MMHG | BODY MASS INDEX: 22.58 KG/M2 | OXYGEN SATURATION: 96 %

## 2024-01-02 DIAGNOSIS — Z86.32 HISTORY OF GESTATIONAL DIABETES MELLITUS (GDM): ICD-10-CM

## 2024-01-02 DIAGNOSIS — Z3A.33 33 WEEKS GESTATION OF PREGNANCY: Primary | ICD-10-CM

## 2024-01-02 PROBLEM — R55 SYNCOPE: Status: RESOLVED | Noted: 2023-11-04 | Resolved: 2024-01-02

## 2024-01-02 PROBLEM — R87.612 LOW GRADE SQUAMOUS INTRAEPITHELIAL LESION (LGSIL) ON PAPANICOLAOU SMEAR OF CERVIX: Status: RESOLVED | Noted: 2023-06-27 | Resolved: 2024-01-02

## 2024-01-02 PROBLEM — O24.419 GESTATIONAL DIABETES MELLITUS (GDM): Status: RESOLVED | Noted: 2023-06-27 | Resolved: 2024-01-02

## 2024-01-02 LAB
BILIRUB SERPL-MCNC: NEGATIVE MG/DL
BLOOD URINE, POC: NORMAL
CLARITY, POC UA: NORMAL
COLOR, POC UA: YELLOW
GLUCOSE UR QL STRIP: NEGATIVE
KETONES UR QL STRIP: NORMAL
LEUKOCYTE ESTERASE URINE, POC: NEGATIVE
NITRITE, POC UA: NEGATIVE
PH, POC UA: 7
PROTEIN, POC: NEGATIVE
SPECIFIC GRAVITY, POC UA: 1.01
UROBILINOGEN, POC UA: NORMAL

## 2024-01-02 PROCEDURE — 81002 URINALYSIS NONAUTO W/O SCOPE: CPT | Mod: PBBFAC

## 2024-01-02 PROCEDURE — 99214 OFFICE O/P EST MOD 30 MIN: CPT | Mod: PBBFAC

## 2024-01-02 NOTE — PROGRESS NOTES
"[facility] Clinic Note    Subjective:      Chief Complaint: Sore Throat and Routine Prenatal Visit (33w6d)    Patient ID: Bennie Escamilla is a 28 y.o. yo  at 33^6 WGA by US. (PATRICE: 2024).  PCP: Rosario, Primary Doctor     : [id number] used for entire duration of visit    Current Concerns:     No unusual complaints.     Chronic Issues:   H/o gDM: diet controlled. Not in current pregnancy.    Gestational History:  OB History    Para Term  AB Living   2 1 1 0 0 1   SAB IAB Ectopic Multiple Live Births   0 0 0 0 1      # Outcome Date GA Lbr Dedrick/2nd Weight Sex Delivery Anes PTL Lv   2 Current            1 Term 21   3.062 kg (6 lb 12 oz) F Vag-Spont  N NEHEMIAS     Gyn History:   - LMP: 23 (exact date)  - Menarche: 12 years old  - Menstrual Hx: 28 day intervals  - Hx of birth control: none  - Hx of STDs: denies  - History of Abnormal PAP: LSIL . Repeat  NIL but with epithelial cell abnormality. Repeat 2023 NIL.    Antepartum specific ROS  - Fetal movements: yes  - Vaginal bleeding: no  - Vaginal discharge: no  - Loss of fluid: no  - Contractions: no  - Headaches: no  - Vision changes: no  - Edema: no    Objective:      /70 (BP Location: Right arm, Patient Position: Sitting, BP Method: Medium (Automatic))   Pulse 107   Temp 97.4 °F (36.3 °C) (Oral)   Resp 18   Ht 5' 0.98" (1.549 m)   Wt 54.3 kg (119 lb 9.6 oz)   LMP 2023 (Approximate)   SpO2 96%   Breastfeeding Unknown   BMI 22.61 kg/m²     Physical Exam:  Gen: alert and oriented, NAD  Resp: CTA bilaterally, nonlabored breathing  CV: RRR, no murmurs, no edema  Abd: gravid, nontender, +BS  - FHTs: 150s bpm  - Fundal height: 34 cm    Ultrasound:  10/25/23  A viable medina pregnancy is visualized in cephalic presentation. Estimated fetal weight is at the 19th percentile with an abdominal circumference at the 15th percentile.   No fetal abnormalities are noted and previous anatomic survey was normal. Amniotic fluid " volume is normal. Placenta is anterior. Rec'd repeat in 4wks    11/22/23  A viable medina pregnancy is visualized in cephalic presentation. Estimated fetal weight is at the 36th percentile with an abdominal circumference at the 30th percentile.   No fetal abnormalities are noted and previous anatomic survey was normal. Amniotic fluid volume is normal. Placenta is anterior.     Initial OB Labs 8/3/2023  - Blood Type and Rh: O+  - Antibody Screen: Neg  - CBC H/H: 12.7/37.2  - HIV: NR  - RPR: NR  - GC: not detected  - CT: Not detected  - HBsAg: NR  - HCVAb: NR  - Rubella: immune  - Varicella: Immune  - UA & Culture: +GBS ( 25,000-50,000 colonies); repeat urine Cx confirmed  - Sickle Cell Screen: neg  - PAP: NIL   - Influenza vaccine date: 10/10/23    15-20 Weeks Lab 8/29/23  - Quad Screen: no increased risk      28 Week Lab: 11/29/23  - 1H GTT: done early at 16wga per [facility] recs - 116  - Rhogam: n/a  - Date of Tdap: 11/29/23  - CBC H/H: 11.3/33.1  - Syphilis Ab: NR  - HIV: NR    36 Week Lab:  - CBC H/H:   - Syphilis Ab:   - HIV:   - GBS Culture:   - Cervical GC:     Current Outpatient Medications   Medication Instructions    magnesium oxide (MAG-OX) 400 mg, Oral, Daily    PNV,calcium 72-iron-folic acid (PRENATAL VITAMIN PLUS LOW IRON) 27 mg iron- 1 mg Tab 1 each, Oral, Daily       Lab Results   Component Value Date    COLORU Yellow 01/02/2024    SPECGRAV 1.015 01/02/2024    PHUR 7.0 01/02/2024    WBCUR Negative 01/02/2024    NITRITE Negative 01/02/2024    PROTEINPOC Negative 01/02/2024    GLUCOSEUR Negative 01/02/2024    KETONESU Trace 01/02/2024    UROBILINOGEN 0.2 E.U./ dL 01/02/2024    BILIRUBINPOC Negative 01/02/2024    RBCUR Trace-intact 01/02/2024        Assessment/Plan:     1. 33 weeks gestation of pregnancy    2. History of gestational diabetes mellitus (GDM)        Orders Placed This Encounter    POCT URINE DIPSTICK WITHOUT MICROSCOPE     - OB protocol  - cont PNVs  - Urine Dipstick reviewed: wnl  - Routine  labs reviewed  - Plans on formula feeding  - Patient declines contraception after delivery, also declines BTL  - Strict labor and ED precautions discussed in depth: Fever, vaginal bleeding or leaking fluid, belly cramping or pain, shortness of breath, chest pain, swelling of the face, hands, ankles, feet, or leg; decreased fetal movement, changes in vision, severe headache that does not resolve with rest or Tylenol.     RTC in 2 weeks. Will need 36 week labs.

## 2024-01-09 NOTE — PROGRESS NOTES
Faculty Attestation: Bennei Escamilla  was seen in Family Medicine Clinic. Discussed with resident at the time of the visit. History of Present Illness, Physical Exam, and Assessment and Plan reviewed. Treatment plan is reasonable and appropriate. Compliance with treatment recommendations is important.         Vane Baron MD  Sports Medicine

## 2024-01-16 ENCOUNTER — OFFICE VISIT (OUTPATIENT)
Dept: FAMILY MEDICINE | Facility: CLINIC | Age: 29
End: 2024-01-16
Payer: MEDICAID

## 2024-01-16 VITALS
DIASTOLIC BLOOD PRESSURE: 60 MMHG | SYSTOLIC BLOOD PRESSURE: 97 MMHG | HEIGHT: 60 IN | HEART RATE: 91 BPM | TEMPERATURE: 98 F | OXYGEN SATURATION: 100 % | WEIGHT: 122.19 LBS | BODY MASS INDEX: 23.99 KG/M2

## 2024-01-16 DIAGNOSIS — Z3A.35 35 WEEKS GESTATION OF PREGNANCY: Primary | ICD-10-CM

## 2024-01-16 LAB
BASOPHILS # BLD AUTO: 0.03 X10(3)/MCL
BASOPHILS NFR BLD AUTO: 0.3 %
BILIRUB SERPL-MCNC: NEGATIVE MG/DL
BLOOD URINE, POC: NEGATIVE
C TRACH DNA SPEC QL NAA+PROBE: NOT DETECTED
CLARITY, POC UA: CLEAR
COLOR, POC UA: YELLOW
EOSINOPHIL # BLD AUTO: 0.14 X10(3)/MCL (ref 0–0.9)
EOSINOPHIL NFR BLD AUTO: 1.3 %
ERYTHROCYTE [DISTWIDTH] IN BLOOD BY AUTOMATED COUNT: 12.9 % (ref 11.5–17)
GLUCOSE UR QL STRIP: NEGATIVE
HCT VFR BLD AUTO: 31.4 % (ref 37–47)
HGB BLD-MCNC: 10.9 G/DL (ref 12–16)
HIV 1+2 AB+HIV1 P24 AG SERPL QL IA: NONREACTIVE
IMM GRANULOCYTES # BLD AUTO: 0.46 X10(3)/MCL (ref 0–0.04)
IMM GRANULOCYTES NFR BLD AUTO: 4.4 %
KETONES UR QL STRIP: NEGATIVE
LEUKOCYTE ESTERASE URINE, POC: NEGATIVE
LYMPHOCYTES # BLD AUTO: 2.07 X10(3)/MCL (ref 0.6–4.6)
LYMPHOCYTES NFR BLD AUTO: 19.9 %
MCH RBC QN AUTO: 32.4 PG (ref 27–31)
MCHC RBC AUTO-ENTMCNC: 34.7 G/DL (ref 33–36)
MCV RBC AUTO: 93.5 FL (ref 80–94)
MONOCYTES # BLD AUTO: 0.57 X10(3)/MCL (ref 0.1–1.3)
MONOCYTES NFR BLD AUTO: 5.5 %
N GONORRHOEA DNA SPEC QL NAA+PROBE: NOT DETECTED
NEUTROPHILS # BLD AUTO: 7.12 X10(3)/MCL (ref 2.1–9.2)
NEUTROPHILS NFR BLD AUTO: 68.6 %
NITRITE, POC UA: NEGATIVE
NRBC BLD AUTO-RTO: 0 %
PH, POC UA: 7
PLATELET # BLD AUTO: 266 X10(3)/MCL (ref 130–400)
PMV BLD AUTO: 8.9 FL (ref 7.4–10.4)
PRENATAL STREP B CULTURE: NEGATIVE
PROTEIN, POC: NEGATIVE
RBC # BLD AUTO: 3.36 X10(6)/MCL (ref 4.2–5.4)
SOURCE (OHS): NORMAL
SPECIFIC GRAVITY, POC UA: 1.01
T PALLIDUM AB SER QL: NONREACTIVE
UROBILINOGEN, POC UA: NORMAL
WBC # SPEC AUTO: 10.39 X10(3)/MCL (ref 4.5–11.5)

## 2024-01-16 PROCEDURE — 99213 OFFICE O/P EST LOW 20 MIN: CPT | Mod: PBBFAC

## 2024-01-16 PROCEDURE — 87491 CHLMYD TRACH DNA AMP PROBE: CPT

## 2024-01-16 PROCEDURE — 87081 CULTURE SCREEN ONLY: CPT

## 2024-01-16 PROCEDURE — 87389 HIV-1 AG W/HIV-1&-2 AB AG IA: CPT

## 2024-01-16 PROCEDURE — 85025 COMPLETE CBC W/AUTO DIFF WBC: CPT

## 2024-01-16 PROCEDURE — 36415 COLL VENOUS BLD VENIPUNCTURE: CPT

## 2024-01-16 PROCEDURE — 86780 TREPONEMA PALLIDUM: CPT

## 2024-01-16 PROCEDURE — 81002 URINALYSIS NONAUTO W/O SCOPE: CPT | Mod: PBBFAC

## 2024-01-16 NOTE — PROGRESS NOTES
[facility] Clinic Note    Subjective:      Chief Complaint: Routine Prenatal Visit (OB 35^6)    Patient ID: Bennie Escamilla is a 28 y.o. yo  at 35^6 WGA by US. (PATRICE: 2024).  PCP: Rosario, Primary Doctor     : [id number] used for entire duration of visit    Current Concerns:     No complaints today.    Chronic Issues:   H/o gDM: diet controlled. Not in current pregnancy.    Gestational History:  OB History    Para Term  AB Living   2 1 1 0 0 1   SAB IAB Ectopic Multiple Live Births   0 0 0 0 1      # Outcome Date GA Lbr Dedrick/2nd Weight Sex Delivery Anes PTL Lv   2 Current            1 Term 21   3.062 kg (6 lb 12 oz) F Vag-Spont  N NEHEMIAS     Gyn History:   - LMP: 23 (exact date)  - Menarche: 12 years old  - Menstrual Hx: 28 day intervals  - Hx of birth control: none  - Hx of STDs: denies  - History of Abnormal PAP: LSIL . Repeat  NIL but with epithelial cell abnormality. Repeat 2023 NIL.    Antepartum specific ROS  - Fetal movements: yes  - Vaginal bleeding: no  - Vaginal discharge: no  - Loss of fluid: no  - Contractions: rarely  - Headaches: no  - Vision changes: no  - Edema: no    Objective:      BP 97/60 (BP Location: Right arm, Patient Position: Sitting, BP Method: Medium (Automatic))   Pulse 91   Temp 98.3 °F (36.8 °C) (Oral)   Ht 5' (1.524 m)   Wt 55.4 kg (122 lb 3.2 oz)   LMP 2023 (Approximate)   SpO2 100%   BMI 23.87 kg/m²     Physical Exam:  Gen: alert and oriented, NAD  Resp: CTA bilaterally, nonlabored breathing  CV: RRR, no murmurs, no edema  Abd: gravid, nontender, +BS  - FHTs: 140s bpm  - Fundal height: 36 cm  Pelvic: External genitalia without erythema, exudate or discharge. Vaginal vault is without discharge. Cervix is of normal color without lesion. The os is closed. There is no bleeding noted. GBS swab and GC CT collected. Chaperone present the entirety of exam.     Ultrasound:  10/25/23  A viable medina pregnancy is visualized in cephalic  presentation. Estimated fetal weight is at the 19th percentile with an abdominal circumference at the 15th percentile.   No fetal abnormalities are noted and previous anatomic survey was normal. Amniotic fluid volume is normal. Placenta is anterior. Rec'd repeat in 4wks    11/22/23  A viable medina pregnancy is visualized in cephalic presentation. Estimated fetal weight is at the 36th percentile with an abdominal circumference at the 30th percentile.   No fetal abnormalities are noted and previous anatomic survey was normal. Amniotic fluid volume is normal. Placenta is anterior.     Initial OB Labs 8/3/2023  - Blood Type and Rh: O+  - Antibody Screen: Neg  - CBC H/H: 12.7/37.2  - HIV: NR  - RPR: NR  - GC: not detected  - CT: Not detected  - HBsAg: NR  - HCVAb: NR  - Rubella: immune  - Varicella: Immune  - UA & Culture: +GBS ( 25,000-50,000 colonies); repeat urine Cx confirmed  - Sickle Cell Screen: neg  - PAP: NIL   - Influenza vaccine date: 10/10/23    15-20 Weeks Lab 8/29/23  - Quad Screen: no increased risk      28 Week Lab: 11/29/23  - 1H GTT: done early at 16wga per [facility] recs - 116  - Rhogam: n/a  - Date of Tdap: 11/29/23  - CBC H/H: 11.3/33.1  - Syphilis Ab: NR  - HIV: NR    36 Week Lab: collected today 1/16/24  - CBC H/H:   - Syphilis Ab:   - HIV:   - GBS Culture:   - Cervical GC:     Current Outpatient Medications   Medication Instructions    magnesium oxide (MAG-OX) 400 mg, Oral, Daily    PNV,calcium 72-iron-folic acid (PRENATAL VITAMIN PLUS LOW IRON) 27 mg iron- 1 mg Tab 1 each, Oral, Daily       Lab Results   Component Value Date    COLORU Yellow 01/16/2024    SPECGRAV 1.015 01/16/2024    PHUR 7.0 01/16/2024    WBCUR negative 01/16/2024    NITRITE negative 01/16/2024    PROTEINPOC negative 01/16/2024    GLUCOSEUR negative 01/16/2024    KETONESU negative 01/16/2024    UROBILINOGEN 0.2 E.U / dL 01/16/2024    BILIRUBINPOC negative 01/16/2024    RBCUR negative 01/16/2024        Assessment/Plan:     1. 35  weeks gestation of pregnancy      Orders Placed This Encounter    Chlamydia/GC, PCR    Strep Only Culture    CBC Auto Differential    SYPHILIS ANTIBODY (WITH REFLEX RPR)    HIV 1/2 Ag/Ab (4th Gen)    CBC with Differential    POCT URINE DIPSTICK WITHOUT MICROSCOPE     - OB protocol  - cont PNVs  - Urine Dipstick reviewed: wnl  - Routine 36 week labs collected today  - Plans on formula feeding  - Patient declines contraception after delivery, also declines BTL  - Strict labor and ED precautions discussed in depth: Fever, vaginal bleeding or leaking fluid, belly cramping or pain, shortness of breath, chest pain, swelling of the face, hands, ankles, feet, or leg; decreased fetal movement, changes in vision, severe headache that does not resolve with rest or Tylenol.     RTC in 1 week for routine OB visit.

## 2024-01-18 LAB — BACTERIA SPEC CULT: ABNORMAL

## 2024-01-23 ENCOUNTER — OFFICE VISIT (OUTPATIENT)
Dept: FAMILY MEDICINE | Facility: CLINIC | Age: 29
End: 2024-01-23
Payer: MEDICAID

## 2024-01-23 VITALS
SYSTOLIC BLOOD PRESSURE: 98 MMHG | WEIGHT: 121 LBS | OXYGEN SATURATION: 97 % | RESPIRATION RATE: 18 BRPM | BODY MASS INDEX: 23.75 KG/M2 | HEIGHT: 60 IN | HEART RATE: 104 BPM | TEMPERATURE: 98 F | DIASTOLIC BLOOD PRESSURE: 66 MMHG

## 2024-01-23 DIAGNOSIS — Z3A.36 36 WEEKS GESTATION OF PREGNANCY: Primary | ICD-10-CM

## 2024-01-23 DIAGNOSIS — B95.1 POSITIVE GBS TEST: ICD-10-CM

## 2024-01-23 LAB
BILIRUB SERPL-MCNC: NEGATIVE MG/DL
BLOOD URINE, POC: NEGATIVE
CLARITY, POC UA: CLEAR
COLOR, POC UA: YELLOW
GLUCOSE UR QL STRIP: NEGATIVE
KETONES UR QL STRIP: NEGATIVE
LEUKOCYTE ESTERASE URINE, POC: NEGATIVE
NITRITE, POC UA: NEGATIVE
PH, POC UA: 7.5
PROTEIN, POC: NEGATIVE
SPECIFIC GRAVITY, POC UA: 1.01
UROBILINOGEN, POC UA: NORMAL

## 2024-01-23 PROCEDURE — 99213 OFFICE O/P EST LOW 20 MIN: CPT | Mod: PBBFAC

## 2024-01-23 PROCEDURE — 81002 URINALYSIS NONAUTO W/O SCOPE: CPT | Mod: PBBFAC

## 2024-01-23 NOTE — PROGRESS NOTES
OB Office Visit Note    Name: Bennie Escamilla  MRN: [id number]  Date: 2024    Subjective:      Chief Complaint: Routine Prenatal Visit (36w6d) and Vaginal Pain      Bennie Escamilla is a 28 y.o.  at 36w6d with PATRICE 2024, by Ultrasound    Current issues: has no unusual complaints    Chronic issues:   denies    PMHx:  hx of gDM in prior pregnancy, not current  PSHx: none  SH: support at home, no tobacco use, and no illicit drug use  FHx: No reported pertinent FHx  Meds:   Prior to Admission medications    Medication Sig Start Date End Date Taking? Authorizing Provider   magnesium oxide (MAG-OX) 400 mg (241.3 mg magnesium) tablet Take 1 tablet (400 mg total) by mouth once daily. 23  Shanel North MD   PNV,calcium 72-iron-folic acid (PRENATAL VITAMIN PLUS LOW IRON) 27 mg iron- 1 mg Tab Take 1 tablet (1 each total) by mouth once daily. 10/10/23 4/7/24  Maykel Hernandez MD     Allergies: Review of patient's allergies indicates:  No Known Allergies    Gestational History:   OB History    Para Term  AB Living   2 1 1 0 0 1   SAB IAB Ectopic Multiple Live Births   0 0 0 0 1      # Outcome Date GA Lbr Dedrick/2nd Weight Sex Delivery Anes PTL Lv   2 Current            1 Term 21   3.062 kg (6 lb 12 oz) F Vag-Spont  N NEHEMIAS       GYNHx:  - LMP: 23 (exact date)  - Menarche: 12 years old  - Menstrual Hx: 28 day intervals  - Hx of birth control: none  - Hx of STDs: denies  - History of Abnormal PAP: LSIL . Repeat  NIL but with epithelial cell abnormality. Repeat 2023 NIL.      Antepartum specific ROS  - Fetal movements: Yes   - Vaginal bleeding: No  - Vaginal discharge: No  - Loss of fluid: No  - Contractions: Yes - occasionally  - Headaches: No  - Vision changes: No  - Edema: No    Review of Systems  Constitutional: no fever, no chills  CV: no chest pain  RESP: no SOB  : no dysuria, no hematuria  GI: no constipation, no diarrhea, no nausea, no vomiting  Psych: no depression, no  anxiety; No SI/HI    Objective:      Vitals:    01/23/24 1517   BP: 98/66   BP Location: Right arm   Patient Position: Sitting   BP Method: Medium (Automatic)   Pulse: 104   Resp: 18   Temp: 97.6 °F (36.4 °C)   TempSrc: Oral   SpO2: 97%   Weight: 54.9 kg (121 lb)   Height: 5' (1.524 m)       Lab Results   Component Value Date    COLORU Yellow 01/23/2024    SPECGRAV 1.015 01/23/2024    PHUR 7.5 01/23/2024    WBCUR Negative 01/23/2024    NITRITE Negative 01/23/2024    PROTEINPOC Negative 01/23/2024    GLUCOSEUR Negative 01/23/2024    KETONESU Negative 01/23/2024    UROBILINOGEN 0.2 E.U./dL 01/23/2024    BILIRUBINPOC Negative 01/23/2024    RBCUR Negative 01/23/2024       General:   RESP: clear to auscultation bilaterally, non labored  CV: regular rate and rhythm, no murmurs, no edema  ABD: gravid, nontender, BS+ FHT present  FHTs: 135 bpm  Fundal height: 35cm    Imaging:  10/25/23  A viable medina pregnancy is visualized in cephalic presentation. Estimated fetal weight is at the 19th percentile with an abdominal circumference at the 15th percentile.   No fetal abnormalities are noted and previous anatomic survey was normal. Amniotic fluid volume is normal. Placenta is anterior. Rec'd repeat in 4wks     11/22/23  A viable medina pregnancy is visualized in cephalic presentation. Estimated fetal weight is at the 36th percentile with an abdominal circumference at the 30th percentile.   No fetal abnormalities are noted and previous anatomic survey was normal. Amniotic fluid volume is normal. Placenta is anterior.      Initial OB Labs 8/3/2023  - Blood Type and Rh: O+  - Antibody Screen: Neg  - CBC H/H: 12.7/37.2  - HIV: NR  - RPR: NR  - GC: not detected  - CT: Not detected  - HBsAg: NR  - HCVAb: NR  - Rubella: immune  - Varicella: Immune  - UA & Culture: +GBS ( 25,000-50,000 colonies); repeat urine Cx confirmed  - Sickle Cell Screen: neg  - PAP: NIL   - Influenza vaccine date: 10/10/23    15-20 Weeks Lab 8/29/23  -  Quad Screen: no increased risk      28 Week Lab: 11/29/23  - 1H GTT: done early at 16wga per [facility] recs - 116  - Rhogam: n/a  - Date of Tdap: 11/29/23  - CBC H/H: 11.3/33.1  - Syphilis Ab: NR  - HIV: NR    36 Week Lab: 1/16/24  - CBC H/H: 10.9/31.4  - Syphilis Ab: NR  - HIV: NR  - GBS Culture: POSITIVE  - Cervical GC: negative    Assessment/Plan:     Bennie was seen today for routine prenatal visit and vaginal pain.    Diagnoses and all orders for this visit:    36 weeks gestation of pregnancy  -     POCT URINE DIPSTICK WITHOUT MICROSCOPE  -     PNVs  -     Urine dip reviewed as above  -     Routine labs: as above  -     Mother plans to breast and/or bottlefeed  -     Postpartum contraception discussion: PENDING  -     ED precautions discussed in depth: vaginal bleeding or leaking fluid, belly cramping or pain, SOB/chest pain, swelling of the face/lower extremities, vision changes. If don't feel the baby move in over an hour. Severe headache that are not resolved with medication    Positive GBS test  -     will need intrapartum abx, confirmed no known allergies       Return to clinic in Follow up in about 1 week (around 1/30/2024) for OB.    Carito Mills MD  [employer] , -II

## 2024-01-30 ENCOUNTER — OFFICE VISIT (OUTPATIENT)
Dept: FAMILY MEDICINE | Facility: CLINIC | Age: 29
End: 2024-01-30
Payer: MEDICAID

## 2024-01-30 VITALS
HEIGHT: 60 IN | TEMPERATURE: 98 F | RESPIRATION RATE: 18 BRPM | WEIGHT: 121.19 LBS | SYSTOLIC BLOOD PRESSURE: 100 MMHG | BODY MASS INDEX: 23.79 KG/M2 | HEART RATE: 91 BPM | DIASTOLIC BLOOD PRESSURE: 69 MMHG | OXYGEN SATURATION: 96 %

## 2024-01-30 DIAGNOSIS — Z3A.37 37 WEEKS GESTATION OF PREGNANCY: Primary | ICD-10-CM

## 2024-01-30 DIAGNOSIS — B95.1 POSITIVE GBS TEST: ICD-10-CM

## 2024-01-30 DIAGNOSIS — E83.42 HYPOMAGNESEMIA: ICD-10-CM

## 2024-01-30 DIAGNOSIS — Z86.32 HISTORY OF GESTATIONAL DIABETES MELLITUS (GDM): ICD-10-CM

## 2024-01-30 LAB
BILIRUB SERPL-MCNC: NEGATIVE MG/DL
BLOOD URINE, POC: NEGATIVE
CLARITY, POC UA: CLEAR
COLOR, POC UA: YELLOW
GLUCOSE UR QL STRIP: NEGATIVE
KETONES UR QL STRIP: NEGATIVE
LEUKOCYTE ESTERASE URINE, POC: NEGATIVE
NITRITE, POC UA: NEGATIVE
PH, POC UA: 7
PROTEIN, POC: NEGATIVE
SPECIFIC GRAVITY, POC UA: 1.02
UROBILINOGEN, POC UA: NORMAL

## 2024-01-30 PROCEDURE — 81002 URINALYSIS NONAUTO W/O SCOPE: CPT | Mod: PBBFAC | Performed by: STUDENT IN AN ORGANIZED HEALTH CARE EDUCATION/TRAINING PROGRAM

## 2024-01-30 PROCEDURE — 99213 OFFICE O/P EST LOW 20 MIN: CPT | Mod: PBBFAC | Performed by: STUDENT IN AN ORGANIZED HEALTH CARE EDUCATION/TRAINING PROGRAM

## 2024-01-30 RX ORDER — LANOLIN ALCOHOL/MO/W.PET/CERES
400 CREAM (GRAM) TOPICAL DAILY
Qty: 30 TABLET | Refills: 1 | Status: ON HOLD | OUTPATIENT
Start: 2024-01-30 | End: 2024-02-16

## 2024-01-30 NOTE — PROGRESS NOTES
[facility] Clinic Note    Subjective:      Chief Complaint: Routine Prenatal Visit (37w6d) and Food insecurity absent    [id number] used for duration of visit    Patient ID: Bennie Escamilla is a 28 y.o. yo  at 37^6WGA by US. (PATRICE: 2024).  PCP: No, Primary Doctor     Current Concerns: needs refill mag. No more syncope episodes. No acute complaints. Excited for baby. Hopes to deliver on 24    Chronic Issues: denies     Gestational History:  (date, GA, length labor, BW, sex, type, anes, place, complications)    OB History    Para Term  AB Living   2 1 1 0 0 1   SAB IAB Ectopic Multiple Live Births   0 0 0 0 1      # Outcome Date GA Lbr Dedrick/2nd Weight Sex Delivery Anes PTL Lv   2 Current            1 Term 21   3.062 kg (6 lb 12 oz) F Vag-Spont  N NEHEMIAS      GYNHx:  - LMP: 23 (exact date)  - Menarche: 12 years old  - Menstrual Hx: 28 day intervals  - Hx of birth control: none  - Hx of STDs: denies  - History of Abnormal PAP: LSIL . Repeat  NIL but with epithelial cell abnormality. Repeat 2023 NIL.    PMHx:  hx of gDM in prior pregnancy, not current  PSHx: none  SH: support at home, no tobacco use, and no illicit drug use  FHx: No reported pertinent FHx  Meds: Mag-ox, PNV    Antepartum specific ROS  - Fetal movements: yes  - Vaginal bleeding: no  - Vaginal discharge: no  - Loss of fluid: no  - Contractions: yes - more frequently at night time. irregular  - Headaches: no  - Vision changes: no  - Edema: no    General ROS  Constitutional: no fever, no chills, no weight loss.  CV: no swelling, no edema, no chest pain.  : no urinary retention, no urinary incontinence, denies dysuria  GI: no fecal incontinence, no constipation, no diarrhea, no nausea, no vomiting.  Resp: no SOB, no wheezing, no difficulty breathing.  Psych: no depression, no anxiety .    Objective:      /69 (BP Location: Right arm, Patient Position: Sitting, BP Method: Medium (Automatic))   Pulse 91    Temp 98.2 °F (36.8 °C) (Oral)   Resp 18   Ht 5' (1.524 m)   Wt 55 kg (121 lb 3.2 oz)   LMP 04/26/2023 (Approximate)   SpO2 96%   Breastfeeding Unknown   BMI 23.67 kg/m²   Physical Exam:  Gen: alert and oriented, NAD  Resp: CTA bilaterally, nonlabored breathing  CV: RRR, no murmurs, no edema  Abd: gravid, nontender, +BS  - FHTs: 140s bpm  - Fundal height: 37.5cm  Cervix: soft, posterior, 1+ fetal station, fingertip to 1cm dilated.    Ultrasound:  10/25/23  A viable medina pregnancy is visualized in cephalic presentation. Estimated fetal weight is at the 19th percentile with an abdominal circumference at the 15th percentile.   No fetal abnormalities are noted and previous anatomic survey was normal. Amniotic fluid volume is normal. Placenta is anterior. Rec'd repeat in 4wks     11/22/23  A viable medina pregnancy is visualized in cephalic presentation. Estimated fetal weight is at the 36th percentile with an abdominal circumference at the 30th percentile.   No fetal abnormalities are noted and previous anatomic survey was normal. Amniotic fluid volume is normal. Placenta is anterior.     nitial OB Labs 8/3/2023  - Blood Type and Rh: O+  - Antibody Screen: Neg  - CBC H/H: 12.7/37.2  - HIV: NR  - RPR: NR  - GC: not detected  - CT: Not detected  - HBsAg: NR  - HCVAb: NR  - Rubella: immune  - Varicella: Immune  - UA & Culture: +GBS ( 25,000-50,000 colonies); repeat urine Cx confirmed  - Sickle Cell Screen: neg  - PAP: NIL   - Influenza vaccine date: 10/10/23    15-20 Weeks Lab 8/29/23  - Quad Screen: no increased risk      28 Week Lab: 11/29/23  - 1H GTT: done early at 16wga per [facility] recs - 116  - Rhogam: n/a  - Date of Tdap: 11/29/23  - CBC H/H: 11.3/33.1  - Syphilis Ab: NR  - HIV: NR    36 Week Lab: 1/16/24  - CBC H/H: 10.9/31.4  - Syphilis Ab: NR  - HIV: NR  - GBS Culture: POSITIVE  - Cervical GC: negative     Current Outpatient Medications   Medication Instructions    magnesium oxide (MAG-OX) 400 mg, Oral,  Daily    PNV,calcium 72-iron-folic acid (PRENATAL VITAMIN PLUS LOW IRON) 27 mg iron- 1 mg Tab 1 each, Oral, Daily       Lab Results   Component Value Date    COLORU Yellow 01/30/2024    SPECGRAV 1.020 01/30/2024    PHUR 7.0 01/30/2024    WBCUR Negative 01/30/2024    NITRITE Negative 01/30/2024    PROTEINPOC Negative 01/30/2024    GLUCOSEUR Negative 01/30/2024    KETONESU Negative 01/30/2024    UROBILINOGEN 0.2 E.U./dL 01/30/2024    BILIRUBINPOC Negative 01/30/2024    RBCUR Negative 01/30/2024        Assessment/Plan:     1. 37 weeks gestation of pregnancy  POCT URINE DIPSTICK WITHOUT MICROSCOPE      2. Positive GBS test        3. Hypomagnesemia        4. History of gestational diabetes mellitus (GDM)            - OB protocol  - cont PNVs  - Urine Dipstick reviewed: wnl  - Routine labs reviewed  - cont mag supplementation  - will need GBS ppx at time of delivery. Pt with NKDA.    - Strict labor and ED precautions discussed in depth: Fever, vaginal bleeding or leaking fluid, belly cramping or pain, shortness of breath, chest pain, swelling of the face, hands, ankles, feet, or leg; decreased fetal movement, changes in vision, severe headache that does not resolve with rest or Tylenol.    RTC 1 wks

## 2024-02-01 PROBLEM — B95.1 POSITIVE GBS TEST: Status: ACTIVE | Noted: 2024-02-01

## 2024-02-01 PROBLEM — E83.42 HYPOMAGNESEMIA: Status: ACTIVE | Noted: 2024-02-01

## 2024-02-01 NOTE — PROGRESS NOTES
I have discussed the case with the resident and reviewed the resident's history and physical, assessment, plan, and progress note. I agree with the findings.       Pa Sanders MD  [facility] Family [facility]

## 2024-02-01 NOTE — PROGRESS NOTES
Faculty Attestation: Bnenie Escamilla  was seen in Family Medicine Clinic. Discussed with resident at the time of the visit. History of Present Illness, Physical Exam, and Assessment and Plan reviewed. Treatment plan is reasonable and appropriate. Compliance with treatment recommendations is important.         Vane Baron MD  Sports Medicine

## 2024-02-06 ENCOUNTER — OFFICE VISIT (OUTPATIENT)
Dept: FAMILY MEDICINE | Facility: CLINIC | Age: 29
End: 2024-02-06
Payer: MEDICAID

## 2024-02-06 VITALS
DIASTOLIC BLOOD PRESSURE: 68 MMHG | TEMPERATURE: 97 F | BODY MASS INDEX: 23.75 KG/M2 | WEIGHT: 121 LBS | HEIGHT: 60 IN | OXYGEN SATURATION: 96 % | RESPIRATION RATE: 20 BRPM | SYSTOLIC BLOOD PRESSURE: 99 MMHG | HEART RATE: 100 BPM

## 2024-02-06 DIAGNOSIS — Z3A.38 38 WEEKS GESTATION OF PREGNANCY: Primary | ICD-10-CM

## 2024-02-06 LAB
BILIRUB SERPL-MCNC: NEGATIVE MG/DL
BLOOD URINE, POC: NEGATIVE
CLARITY, POC UA: NORMAL
COLOR, POC UA: YELLOW
GLUCOSE UR QL STRIP: NEGATIVE
KETONES UR QL STRIP: NEGATIVE
LEUKOCYTE ESTERASE URINE, POC: NEGATIVE
NITRITE, POC UA: NEGATIVE
PH, POC UA: 7
PROTEIN, POC: NEGATIVE
SPECIFIC GRAVITY, POC UA: 1.01
UROBILINOGEN, POC UA: 0.2

## 2024-02-06 PROCEDURE — 99213 OFFICE O/P EST LOW 20 MIN: CPT | Mod: PBBFAC | Performed by: STUDENT IN AN ORGANIZED HEALTH CARE EDUCATION/TRAINING PROGRAM

## 2024-02-06 PROCEDURE — 81002 URINALYSIS NONAUTO W/O SCOPE: CPT | Mod: PBBFAC | Performed by: STUDENT IN AN ORGANIZED HEALTH CARE EDUCATION/TRAINING PROGRAM

## 2024-02-06 NOTE — PROGRESS NOTES
[facility] FMOB Clinic Note    Subjective:      Chief Complaint: Routine Prenatal Visit (38W6D. Having contractions at night. C/o left leg pain from a sitting to standing position) and Food insecurity absent   896002 used for duration of visit    Patient ID: Bennie Escamilla is a 28 y.o. yo  at 38^6WGA by US. (PATRICE: 2024).  PCP: No, Primary Doctor     Current Concerns: L leg pain like a cramp, lasts <1min. Occurs 3-4 times in a day. More painful. Usually occurs when going from sitting to standing. Never happens while resting/laying down. No lingering pain after cramping. Some tingling to vaginal area. Nonine to leg. Cramp to L side, does not occur on R side.     Chronic Issues: denies     Gestational History:  (date, GA, length labor, BW, sex, type, anes, place, complications)    OB History    Para Term  AB Living   2 1 1 0 0 1   SAB IAB Ectopic Multiple Live Births   0 0 0 0 1      # Outcome Date GA Lbr Dedrick/2nd Weight Sex Delivery Anes PTL Lv   2 Current            1 Term 21   3.062 kg (6 lb 12 oz) F Vag-Spont  N NEHEMIAS      GYNHx:  - LMP: 23 (exact date)  - Menarche: 12 years old  - Menstrual Hx: 28 day intervals  - Hx of birth control: none  - Hx of STDs: denies  - History of Abnormal PAP: LSIL . Repeat  NIL but with epithelial cell abnormality. Repeat 2023 NIL.    PMHx:  hx of gDM in prior pregnancy, not current  PSHx: none  SH: support at home, no tobacco use, and no illicit drug use  FHx: No reported pertinent FHx  Meds: Mag-ox, PNV    Antepartum specific ROS  - Fetal movements: yes  - Vaginal bleeding: no  - Vaginal discharge: little, unchanged  - Loss of fluid: no  - Contractions: yes, often at night, 5-10 min, irregular during day  - Headaches: no  - Vision changes: no  - Edema: no    General ROS  Constitutional: no fever, no chills, no weight loss.  CV: no swelling, no edema, no chest pain.  : no urinary retention, no urinary incontinence, denies dysuria  GI: no  fecal incontinence, no constipation, no diarrhea, no nausea, no vomiting.  Resp: no SOB, no wheezing, no difficulty breathing.  Psych: no depression, no anxiety.    Objective:      BP 99/68 (BP Location: Left arm, Patient Position: Sitting, BP Method: Medium (Automatic))   Pulse 100   Temp 96.8 °F (36 °C) (Oral)   Resp 20   Ht 5' (1.524 m)   Wt 54.9 kg (121 lb)   LMP 04/26/2023 (Approximate)   SpO2 96%   BMI 23.63 kg/m²   Physical Exam:  Gen: alert and oriented, NAD  Resp: CTA bilaterally, nonlabored breathing  CV: RRR, no murmurs, no edema  Abd: gravid, nontender, +BS  - FHTs: 147-150s bpm  - Fundal height: 39cm  Cervix: soft, posterior, 1+ fetal station, fingertip dilated.    Ultrasound:  10/25/23  A viable medina pregnancy is visualized in cephalic presentation. Estimated fetal weight is at the 19th percentile with an abdominal circumference at the 15th percentile.   No fetal abnormalities are noted and previous anatomic survey was normal. Amniotic fluid volume is normal. Placenta is anterior. Rec'd repeat in 4wks     11/22/23  A viable medina pregnancy is visualized in cephalic presentation. Estimated fetal weight is at the 36th percentile with an abdominal circumference at the 30th percentile.   No fetal abnormalities are noted and previous anatomic survey was normal. Amniotic fluid volume is normal. Placenta is anterior.     nitial OB Labs 8/3/2023  - Blood Type and Rh: O+  - Antibody Screen: Neg  - CBC H/H: 12.7/37.2  - HIV: NR  - RPR: NR  - GC: not detected  - CT: Not detected  - HBsAg: NR  - HCVAb: NR  - Rubella: immune  - Varicella: Immune  - UA & Culture: +GBS ( 25,000-50,000 colonies); repeat urine Cx confirmed  - Sickle Cell Screen: neg  - PAP: NIL   - Influenza vaccine date: 10/10/23    15-20 Weeks Lab 8/29/23  - Quad Screen: no increased risk      28 Week Lab: 11/29/23  - 1H GTT: done early at 16wga per [facility] recs - 116  - Rhogam: n/a  - Date of Tdap: 11/29/23  - CBC H/H: 11.3/33.1  -  Syphilis Ab: NR  - HIV: NR    36 Week Lab: 1/16/24  - CBC H/H: 10.9/31.4  - Syphilis Ab: NR  - HIV: NR  - GBS Culture: POSITIVE  - Cervical GC: negative     Current Outpatient Medications   Medication Instructions    magnesium oxide (MAG-OX) 400 mg, Oral, Daily    PNV,calcium 72-iron-folic acid (PRENATAL VITAMIN PLUS LOW IRON) 27 mg iron- 1 mg Tab 1 each, Oral, Daily       Lab Results   Component Value Date    COLORU Yellow 02/06/2024    SPECGRAV 1.015 02/06/2024    PHUR 7 02/06/2024    WBCUR negative 02/06/2024    NITRITE negative 02/06/2024    PROTEINPOC negative 02/06/2024    GLUCOSEUR negative 02/06/2024    KETONESU negative 02/06/2024    UROBILINOGEN 0.2 02/06/2024    BILIRUBINPOC negative 02/06/2024    RBCUR negative 02/06/2024        Assessment/Plan:     1. 38 weeks gestation of pregnancy  POCT URINE DIPSTICK WITHOUT MICROSCOPE        - OB protocol  - cont PNVs  - Urine Dipstick reviewed: wnl  - Routine labs reviewed  - cont mag supplementation  - will need GBS ppx at time of delivery. Pt with NKDA.  - L leg pain likely 2/2 round ligament pain vs msk etiology from carrying pregnancy    - Strict labor and ED precautions discussed in depth: Fever, vaginal bleeding or leaking fluid, belly cramping or pain, shortness of breath, chest pain, swelling of the face, hands, ankles, feet, or leg; decreased fetal movement, changes in vision, severe headache that does not resolve with rest or Tylenol.    RTC 1 wks

## 2024-02-07 NOTE — PROGRESS NOTES
Faculty Attestation: Bennie Escamilla  was seen in Family Medicine Clinic. Discussed with resident at the time of the visit. History of Present Illness, Physical Exam, and Assessment and Plan reviewed. Treatment plan is reasonable and appropriate. Compliance with treatment recommendations is important.       Rupinder Darling MD  Family Medicine

## 2024-02-13 ENCOUNTER — HOSPITAL ENCOUNTER (EMERGENCY)
Facility: HOSPITAL | Age: 29
Discharge: HOME | End: 2024-02-13
Payer: MEDICAID

## 2024-02-13 VITALS
RESPIRATION RATE: 16 BRPM | SYSTOLIC BLOOD PRESSURE: 108 MMHG | TEMPERATURE: 98 F | HEART RATE: 87 BPM | DIASTOLIC BLOOD PRESSURE: 69 MMHG

## 2024-02-13 PROCEDURE — 99284 EMERGENCY DEPT VISIT MOD MDM: CPT

## 2024-02-13 NOTE — ED PROVIDER NOTES
IVONNE NOTE     Admit Date: 2024  IVONNE Physician: Grayson Carranza  Primary OBGYN: /OB Hospitalist Group    Chief Complaint   Patient presents with    Contractions       Hospital Course:  Bennie Escamilla is a 28 y.o.  at 39w6d presents complaining of uterine contractions for several hours.    This IUP is complicated by history of gestational diabetes in 1st pregnancy.  Positive group B strep test.    Patient denies vaginal bleeding, leakage of fluid, headache, vision changes, RUQ pain, dysuria, fever, and nausea/vomiting.  Fetal Movement: normal.      /69   Pulse 87   Temp 98.2 °F (36.8 °C) (Oral)   Resp 16   LMP 2023 (Approximate)   Temp:  [98.2 °F (36.8 °C)] 98.2 °F (36.8 °C)  Pulse:  [87] 87  Resp:  [16] 16  BP: (108)/(69) 108/69    General: in no apparent distress  Cardiovascular: regular rate and rhythm no murmurs  Respiratory: clear to auscultation, no wheezes, rales or rhonchi, symmetric air entry  Abdominal: fundus soft, nontender between contractions, 38 weeks size FHT present  Back: lumbar tenderness present CVA tenderness none  Extremeties no redness or tenderness in the calves or thighs no edema    SSE:   SVE:  1 cm, 60% effaced,-3, membranes intact      FHT: Category 1  TOCO:  Uterine contractions 3-6 minutes apart.    Medical Decision Making:  Patient will be observed and re-evaluated after approximately an hour.    Patient was re-examined after multiple 1 hour intervals with no cervical changes.  Uterine contractions appear to have decreased.  Patient will be discharged home today.  Signs and symptoms of active labor were discussed.  LABS:   No results found for this or any previous visit (from the past 24 hour(s)).  [unfilled]     Imaging Results    None          ASSESMENT: Bennie Escamilla is a 28 y.o.   at 39w6d with uterine contractions with no cervical changes consistent with labor.    Discharge Diagnosis/Clinical Impression:  Pregnancy 39 weeks.  Uterine  inertia.    Status:Stable    Disposition:  discharged to home    Medications:       Patient Instructions:   Current Discharge Medication List        CONTINUE these medications which have NOT CHANGED    Details   magnesium oxide (MAG-OX) 400 mg (241.3 mg magnesium) tablet Take 1 tablet (400 mg total) by mouth once daily.  Qty: 30 tablet, Refills: 1      PNV,calcium 72-iron-folic acid (PRENATAL VITAMIN PLUS LOW IRON) 27 mg iron- 1 mg Tab Take 1 tablet (1 each total) by mouth once daily.  Qty: 90 tablet, Refills: 1    Associated Diagnoses: 21 weeks gestation of pregnancy             - Pt was given routine pregnancy instructions including to return to triage if she had any vaginal bleeding (other than spotting for the next 48hrs), any loss of fluid like her water broke, decreased fetal movement, or contractions Q 5min lasting for 2 or more hours. Pt was also instructed to drink copious water. Patient voiced understanding of all these instructions and was subsequently discharged home.    She will follow up with her primary OB.    No discharge procedures on file.    Grayson Carranza MD  OB-GYN Hospitalist       Grayson Carranza MD  02/13/24 1861     Nasalis-Muscle-Based Myocutaneous Island Pedicle Flap Text: Using a #15 blade, an incision was made around the donor flap to the level of the nasalis muscle. Wide lateral undermining was then performed in both the subcutaneous plane above the nasalis muscle, and in a submuscular plane just above periosteum. This allowed the formation of a free nasalis muscle axial pedicle (based on the angular artery) which was still attached to the actual cutaneous flap, increasing its mobility and vascular viability. Hemostasis was obtained with pinpoint electrocoagulation. The flap was mobilized into position and the pivotal anchor points positioned and stabilized with buried interrupted sutures. Subcutaneous and dermal tissues were closed in a multilayered fashion with sutures. Tissue redundancies were excised, and the epidermal edges were apposed without significant tension and sutured with sutures.

## 2024-02-15 ENCOUNTER — OFFICE VISIT (OUTPATIENT)
Dept: FAMILY MEDICINE | Facility: CLINIC | Age: 29
End: 2024-02-15
Payer: MEDICAID

## 2024-02-15 VITALS
OXYGEN SATURATION: 96 % | SYSTOLIC BLOOD PRESSURE: 101 MMHG | TEMPERATURE: 98 F | WEIGHT: 122.63 LBS | RESPIRATION RATE: 18 BRPM | DIASTOLIC BLOOD PRESSURE: 68 MMHG | BODY MASS INDEX: 24.07 KG/M2 | HEIGHT: 60 IN | HEART RATE: 94 BPM

## 2024-02-15 DIAGNOSIS — B95.1 POSITIVE GBS TEST: ICD-10-CM

## 2024-02-15 DIAGNOSIS — Z3A.40 40 WEEKS GESTATION OF PREGNANCY: Primary | ICD-10-CM

## 2024-02-15 DIAGNOSIS — E83.42 HYPOMAGNESEMIA: ICD-10-CM

## 2024-02-15 LAB
BILIRUB SERPL-MCNC: NEGATIVE MG/DL
BLOOD URINE, POC: NEGATIVE
CLARITY, POC UA: CLEAR
COLOR, POC UA: NORMAL
GLUCOSE UR QL STRIP: NEGATIVE
KETONES UR QL STRIP: NEGATIVE
LEUKOCYTE ESTERASE URINE, POC: NEGATIVE
NITRITE, POC UA: NEGATIVE
PH, POC UA: 7
PROTEIN, POC: NEGATIVE
SPECIFIC GRAVITY, POC UA: 1.01
UROBILINOGEN, POC UA: NORMAL

## 2024-02-15 PROCEDURE — 81002 URINALYSIS NONAUTO W/O SCOPE: CPT | Mod: PBBFAC

## 2024-02-15 PROCEDURE — 99213 OFFICE O/P EST LOW 20 MIN: CPT | Mod: PBBFAC

## 2024-02-15 NOTE — PROGRESS NOTES
[facility] FMOB Clinic Note    Subjective:      Chief Complaint: Routine Prenatal Visit (40w1d)   794692 used for duration of visit    Patient ID: Bennie Escamilla is a 28 y.o. yo  at 40^1WGA by US. (PATRICE: 2024).  PCP: No, Primary Doctor     Interval Hx:   Had OB ED visit 24 for uterine contractions. No cervical changes, FHT: Category 1, TOCO:  Uterine contractions 3-6 minutes apart. Discharged when contractions decreased.     Today, has no complaints.     Chronic Issues: denies     Gestational History:  (date, GA, length labor, BW, sex, type, anes, place, complications)    OB History    Para Term  AB Living   2 1 1 0 0 1   SAB IAB Ectopic Multiple Live Births   0 0 0 0 1      # Outcome Date GA Lbr Dedrick/2nd Weight Sex Delivery Anes PTL Lv   2 Current            1 Term 21   3.062 kg (6 lb 12 oz) F Vag-Spont  N NEHEMIAS      GYNHx:  - LMP: 23 (exact date)  - Menarche: 12 years old  - Menstrual Hx: 28 day intervals  - Hx of birth control: none  - Hx of STDs: denies  - History of Abnormal PAP: LSIL . Repeat  NIL but with epithelial cell abnormality. Repeat 2023 NIL.    PMHx:  hx of gDM in prior pregnancy, not current  PSHx: none  SH: support at home, no tobacco use, and no illicit drug use  FHx: No reported pertinent FHx  Meds: Mag-ox, PNV    Antepartum specific ROS  - Fetal movements: yes  - Vaginal bleeding: no  - Vaginal discharge: little, physiologic  - Loss of fluid: no  - Contractions: yes, often at night, 5-10 min, irregular during day  - Headaches: no  - Vision changes: no  - Edema: no    General ROS  Constitutional: no fever, no chills, no weight loss.  CV: no swelling, no edema, no chest pain.  : no urinary retention, no urinary incontinence, denies dysuria  GI: no fecal incontinence, no constipation, no diarrhea, no nausea, no vomiting.  Resp: no SOB, no wheezing, no difficulty breathing.  Psych: no depression, no anxiety.    Objective:      /68 (BP Location:  Right arm, Patient Position: Sitting, BP Method: Small (Automatic))   Pulse 94   Temp 97.7 °F (36.5 °C) (Oral)   Resp 18   Ht 5' (1.524 m)   Wt 55.6 kg (122 lb 9.6 oz)   LMP 04/26/2023 (Approximate)   SpO2 96%   BMI 23.94 kg/m²   Physical Exam:  Gen: alert and oriented, NAD  Resp: CTA bilaterally, nonlabored breathing  CV: RRR, no murmurs, no edema  Abd: gravid, nontender, +BS  - FHTs: 150 bpm baseline by NST  - Fundal height: 40cm  Cervix: soft, midline, 2 cm dilated.    Ultrasound:  10/25/23  A viable medina pregnancy is visualized in cephalic presentation. Estimated fetal weight is at the 19th percentile with an abdominal circumference at the 15th percentile.   No fetal abnormalities are noted and previous anatomic survey was normal. Amniotic fluid volume is normal. Placenta is anterior. Rec'd repeat in 4wks     11/22/23  A viable medina pregnancy is visualized in cephalic presentation. Estimated fetal weight is at the 36th percentile with an abdominal circumference at the 30th percentile.   No fetal abnormalities are noted and previous anatomic survey was normal. Amniotic fluid volume is normal. Placenta is anterior.     nitial OB Labs 8/3/2023  - Blood Type and Rh: O+  - Antibody Screen: Neg  - CBC H/H: 12.7/37.2  - HIV: NR  - RPR: NR  - GC: not detected  - CT: Not detected  - HBsAg: NR  - HCVAb: NR  - Rubella: immune  - Varicella: Immune  - UA & Culture: +GBS ( 25,000-50,000 colonies); repeat urine Cx confirmed  - Sickle Cell Screen: neg  - PAP: NIL   - Influenza vaccine date: 10/10/23    15-20 Weeks Lab 8/29/23  - Quad Screen: no increased risk      28 Week Lab: 11/29/23  - 1H GTT: done early at 16wga per [facility] recs - 116  - Rhogam: n/a  - Date of Tdap: 11/29/23  - CBC H/H: 11.3/33.1  - Syphilis Ab: NR  - HIV: NR    36 Week Lab: 1/16/24  - CBC H/H: 10.9/31.4  - Syphilis Ab: NR  - HIV: NR  - GBS Culture: POSITIVE  - Cervical GC: negative     Current Outpatient Medications   Medication Instructions     magnesium oxide (MAG-OX) 400 mg, Oral, Daily    PNV,calcium 72-iron-folic acid (PRENATAL VITAMIN PLUS LOW IRON) 27 mg iron- 1 mg Tab 1 each, Oral, Daily       Lab Results   Component Value Date    COLORU Light Yellow 02/15/2024    SPECGRAV 1.015 02/15/2024    PHUR 7.0 02/15/2024    WBCUR Negative 02/15/2024    NITRITE Negative 02/15/2024    PROTEINPOC Negative 02/15/2024    GLUCOSEUR Negative 02/15/2024    KETONESU Negative 02/15/2024    UROBILINOGEN 0.2 E.U./dL 02/15/2024    BILIRUBINPOC Negative 02/15/2024    RBCUR Negative 02/15/2024        Assessment/Plan:     1. 40 weeks gestation of pregnancy  POCT URINE DIPSTICK WITHOUT MICROSCOPE      2. Positive GBS test        3. Hypomagnesemia          - OB protocol  - cont PNVs, mag supplementation  - Urine Dipstick reviewed: wnl  - Routine labs reviewed  - will need GBS ppx at time of delivery. Pt with NKDA.  - Plans on formula feeding   - Patient declines contraception after delivery     - NST today reviewed with staff, reactive  - Scheduled for induction 2/20/24 Tuesday, 5:30 am.     - Strict labor and ED precautions discussed in depth: Fever, vaginal bleeding or leaking fluid, belly cramping or pain, shortness of breath, chest pain, swelling of the face, hands, ankles, feet, or leg; decreased fetal movement, changes in vision, severe headache that does not resolve with rest or Tylenol.    RTC PRN.

## 2024-02-16 ENCOUNTER — ANESTHESIA (OUTPATIENT)
Dept: OBSTETRICS AND GYNECOLOGY | Facility: HOSPITAL | Age: 29
End: 2024-02-16
Payer: MEDICAID

## 2024-02-16 ENCOUNTER — ANESTHESIA EVENT (OUTPATIENT)
Dept: OBSTETRICS AND GYNECOLOGY | Facility: HOSPITAL | Age: 29
End: 2024-02-16
Payer: MEDICAID

## 2024-02-16 ENCOUNTER — HOSPITAL ENCOUNTER (INPATIENT)
Facility: HOSPITAL | Age: 29
LOS: 2 days | Discharge: HOME | End: 2024-02-18
Attending: OBSTETRICS & GYNECOLOGY | Admitting: OBSTETRICS & GYNECOLOGY
Payer: MEDICAID

## 2024-02-16 DIAGNOSIS — R10.9 ABDOMINAL PAIN AFFECTING PREGNANCY: ICD-10-CM

## 2024-02-16 DIAGNOSIS — O26.899 ABDOMINAL PAIN AFFECTING PREGNANCY: ICD-10-CM

## 2024-02-16 DIAGNOSIS — Z34.90 PREGNANCY: Primary | ICD-10-CM

## 2024-02-16 LAB
BASOPHILS # BLD AUTO: 0.02 X10(3)/MCL
BASOPHILS NFR BLD AUTO: 0.2 %
EOSINOPHIL # BLD AUTO: 0.09 X10(3)/MCL (ref 0–0.9)
EOSINOPHIL NFR BLD AUTO: 1.1 %
ERYTHROCYTE [DISTWIDTH] IN BLOOD BY AUTOMATED COUNT: 13.3 % (ref 11.5–17)
GROUP & RH: NORMAL
HCT VFR BLD AUTO: 37.1 % (ref 37–47)
HGB BLD-MCNC: 12.7 G/DL (ref 12–16)
IMM GRANULOCYTES # BLD AUTO: 0.1 X10(3)/MCL (ref 0–0.04)
IMM GRANULOCYTES NFR BLD AUTO: 1.2 %
INDIRECT COOMBS: NORMAL
LYMPHOCYTES # BLD AUTO: 2.51 X10(3)/MCL (ref 0.6–4.6)
LYMPHOCYTES NFR BLD AUTO: 30.8 %
MCH RBC QN AUTO: 32.6 PG (ref 27–31)
MCHC RBC AUTO-ENTMCNC: 34.2 G/DL (ref 33–36)
MCV RBC AUTO: 95.1 FL (ref 80–94)
MONOCYTES # BLD AUTO: 0.44 X10(3)/MCL (ref 0.1–1.3)
MONOCYTES NFR BLD AUTO: 5.4 %
NEUTROPHILS # BLD AUTO: 5 X10(3)/MCL (ref 2.1–9.2)
NEUTROPHILS NFR BLD AUTO: 61.3 %
NRBC BLD AUTO-RTO: 0 %
PLATELET # BLD AUTO: 219 X10(3)/MCL (ref 130–400)
PMV BLD AUTO: 9 FL (ref 7.4–10.4)
RBC # BLD AUTO: 3.9 X10(6)/MCL (ref 4.2–5.4)
SPECIMEN OUTDATE: NORMAL
T PALLIDUM AB SER QL: NONREACTIVE
WBC # SPEC AUTO: 8.16 X10(3)/MCL (ref 4.5–11.5)

## 2024-02-16 PROCEDURE — 99900035 HC TECH TIME PER 15 MIN (STAT)

## 2024-02-16 PROCEDURE — 59409 OBSTETRICAL CARE: CPT | Mod: QZ,,,

## 2024-02-16 PROCEDURE — 25000003 PHARM REV CODE 250

## 2024-02-16 PROCEDURE — 25000003 PHARM REV CODE 250: Performed by: OBSTETRICS & GYNECOLOGY

## 2024-02-16 PROCEDURE — 85025 COMPLETE CBC W/AUTO DIFF WBC: CPT | Performed by: OBSTETRICS & GYNECOLOGY

## 2024-02-16 PROCEDURE — 11000001 HC ACUTE MED/SURG PRIVATE ROOM

## 2024-02-16 PROCEDURE — 0UQGXZZ REPAIR VAGINA, EXTERNAL APPROACH: ICD-10-PCS | Performed by: OBSTETRICS & GYNECOLOGY

## 2024-02-16 PROCEDURE — 10907ZC DRAINAGE OF AMNIOTIC FLUID, THERAPEUTIC FROM PRODUCTS OF CONCEPTION, VIA NATURAL OR ARTIFICIAL OPENING: ICD-10-PCS | Performed by: OBSTETRICS & GYNECOLOGY

## 2024-02-16 PROCEDURE — 99999 HC NO LEVEL OF SERVICE - ED ONLY: CPT

## 2024-02-16 PROCEDURE — 72100002 HC LABOR CARE, 1ST 8 HOURS

## 2024-02-16 PROCEDURE — 72100003 HC LABOR CARE, EA. ADDL. 8 HRS

## 2024-02-16 PROCEDURE — 86780 TREPONEMA PALLIDUM: CPT | Performed by: OBSTETRICS & GYNECOLOGY

## 2024-02-16 PROCEDURE — 63600175 PHARM REV CODE 636 W HCPCS: Performed by: OBSTETRICS & GYNECOLOGY

## 2024-02-16 PROCEDURE — 51702 INSERT TEMP BLADDER CATH: CPT

## 2024-02-16 PROCEDURE — 72200005 HC VAGINAL DELIVERY LEVEL II

## 2024-02-16 PROCEDURE — 0KQM0ZZ REPAIR PERINEUM MUSCLE, OPEN APPROACH: ICD-10-PCS | Performed by: OBSTETRICS & GYNECOLOGY

## 2024-02-16 PROCEDURE — 62326 NJX INTERLAMINAR LMBR/SAC: CPT

## 2024-02-16 PROCEDURE — 86850 RBC ANTIBODY SCREEN: CPT | Performed by: OBSTETRICS & GYNECOLOGY

## 2024-02-16 RX ORDER — MISOPROSTOL 100 UG/1
800 TABLET ORAL ONCE AS NEEDED
Status: DISCONTINUED | OUTPATIENT
Start: 2024-02-16 | End: 2024-02-16

## 2024-02-16 RX ORDER — CARBOPROST TROMETHAMINE 250 UG/ML
250 INJECTION, SOLUTION INTRAMUSCULAR
Status: DISCONTINUED | OUTPATIENT
Start: 2024-02-16 | End: 2024-02-16

## 2024-02-16 RX ORDER — METHYLERGONOVINE MALEATE 0.2 MG/ML
200 INJECTION INTRAVENOUS ONCE AS NEEDED
Status: DISCONTINUED | OUTPATIENT
Start: 2024-02-16 | End: 2024-02-16

## 2024-02-16 RX ORDER — CALCIUM CARBONATE 200(500)MG
500 TABLET,CHEWABLE ORAL 3 TIMES DAILY PRN
Status: DISCONTINUED | OUTPATIENT
Start: 2024-02-16 | End: 2024-02-16

## 2024-02-16 RX ORDER — DIPHENOXYLATE HYDROCHLORIDE AND ATROPINE SULFATE 2.5; .025 MG/1; MG/1
2 TABLET ORAL EVERY 6 HOURS PRN
Status: DISCONTINUED | OUTPATIENT
Start: 2024-02-16 | End: 2024-02-16

## 2024-02-16 RX ORDER — DEXTROSE, SODIUM CHLORIDE, SODIUM LACTATE, POTASSIUM CHLORIDE, AND CALCIUM CHLORIDE 5; .6; .31; .03; .02 G/100ML; G/100ML; G/100ML; G/100ML; G/100ML
INJECTION, SOLUTION INTRAVENOUS CONTINUOUS
Status: DISCONTINUED | OUTPATIENT
Start: 2024-02-16 | End: 2024-02-16

## 2024-02-16 RX ORDER — FENTANYL/BUPIVACAINE/NS/PF 2-1250MCG
PLASTIC BAG, INJECTION (ML) INJECTION CONTINUOUS PRN
Status: DISCONTINUED | OUTPATIENT
Start: 2024-02-16 | End: 2024-02-16

## 2024-02-16 RX ORDER — OXYTOCIN 10 [USP'U]/ML
10 INJECTION, SOLUTION INTRAMUSCULAR; INTRAVENOUS ONCE AS NEEDED
Status: DISCONTINUED | OUTPATIENT
Start: 2024-02-16 | End: 2024-02-18 | Stop reason: HOSPADM

## 2024-02-16 RX ORDER — PRENATAL WITH FERROUS FUM AND FOLIC ACID 3080; 920; 120; 400; 22; 1.84; 3; 20; 10; 1; 12; 200; 27; 25; 2 [IU]/1; [IU]/1; MG/1; [IU]/1; MG/1; MG/1; MG/1; MG/1; MG/1; MG/1; UG/1; MG/1; MG/1; MG/1; MG/1
1 TABLET ORAL DAILY
Status: DISCONTINUED | OUTPATIENT
Start: 2024-02-16 | End: 2024-02-18 | Stop reason: HOSPADM

## 2024-02-16 RX ORDER — ONDANSETRON 4 MG/1
8 TABLET, ORALLY DISINTEGRATING ORAL EVERY 8 HOURS PRN
Status: DISCONTINUED | OUTPATIENT
Start: 2024-02-16 | End: 2024-02-18 | Stop reason: HOSPADM

## 2024-02-16 RX ORDER — SODIUM CHLORIDE 0.9 % (FLUSH) 0.9 %
10 SYRINGE (ML) INJECTION
Status: DISCONTINUED | OUTPATIENT
Start: 2024-02-16 | End: 2024-02-18 | Stop reason: HOSPADM

## 2024-02-16 RX ORDER — DIPHENHYDRAMINE HCL 25 MG
25 CAPSULE ORAL EVERY 4 HOURS PRN
Status: DISCONTINUED | OUTPATIENT
Start: 2024-02-16 | End: 2024-02-18 | Stop reason: HOSPADM

## 2024-02-16 RX ORDER — BUTORPHANOL TARTRATE 2 MG/ML
1 INJECTION INTRAMUSCULAR; INTRAVENOUS
Status: DISCONTINUED | OUTPATIENT
Start: 2024-02-16 | End: 2024-02-16

## 2024-02-16 RX ORDER — LIDOCAINE HYDROCHLORIDE AND EPINEPHRINE 15; 5 MG/ML; UG/ML
INJECTION, SOLUTION EPIDURAL
Status: DISCONTINUED | OUTPATIENT
Start: 2024-02-16 | End: 2024-02-16

## 2024-02-16 RX ORDER — OXYTOCIN/RINGER'S LACTATE 30/500 ML
95 PLASTIC BAG, INJECTION (ML) INTRAVENOUS ONCE
Status: DISCONTINUED | OUTPATIENT
Start: 2024-02-16 | End: 2024-02-18 | Stop reason: HOSPADM

## 2024-02-16 RX ORDER — BUTORPHANOL TARTRATE 2 MG/ML
2 INJECTION INTRAMUSCULAR; INTRAVENOUS
Status: DISCONTINUED | OUTPATIENT
Start: 2024-02-16 | End: 2024-02-16

## 2024-02-16 RX ORDER — FAMOTIDINE 10 MG/ML
20 INJECTION INTRAVENOUS ONCE
Status: DISCONTINUED | OUTPATIENT
Start: 2024-02-16 | End: 2024-02-16

## 2024-02-16 RX ORDER — OXYTOCIN/RINGER'S LACTATE 30/500 ML
0-30 PLASTIC BAG, INJECTION (ML) INTRAVENOUS CONTINUOUS
Status: DISCONTINUED | OUTPATIENT
Start: 2024-02-16 | End: 2024-02-16

## 2024-02-16 RX ORDER — CARBOPROST TROMETHAMINE 250 UG/ML
250 INJECTION, SOLUTION INTRAMUSCULAR
Status: DISCONTINUED | OUTPATIENT
Start: 2024-02-16 | End: 2024-02-18 | Stop reason: HOSPADM

## 2024-02-16 RX ORDER — OXYCODONE AND ACETAMINOPHEN 5; 325 MG/1; MG/1
1 TABLET ORAL EVERY 4 HOURS PRN
Status: DISCONTINUED | OUTPATIENT
Start: 2024-02-16 | End: 2024-02-18 | Stop reason: HOSPADM

## 2024-02-16 RX ORDER — MISOPROSTOL 100 UG/1
800 TABLET ORAL ONCE AS NEEDED
Status: DISCONTINUED | OUTPATIENT
Start: 2024-02-16 | End: 2024-02-18 | Stop reason: HOSPADM

## 2024-02-16 RX ORDER — ONDANSETRON 4 MG/1
8 TABLET, ORALLY DISINTEGRATING ORAL EVERY 8 HOURS PRN
Status: DISCONTINUED | OUTPATIENT
Start: 2024-02-16 | End: 2024-02-16

## 2024-02-16 RX ORDER — OXYTOCIN 10 [USP'U]/ML
10 INJECTION, SOLUTION INTRAMUSCULAR; INTRAVENOUS ONCE AS NEEDED
Status: DISCONTINUED | OUTPATIENT
Start: 2024-02-16 | End: 2024-02-16

## 2024-02-16 RX ORDER — IBUPROFEN 600 MG/1
600 TABLET ORAL EVERY 6 HOURS PRN
Status: DISCONTINUED | OUTPATIENT
Start: 2024-02-16 | End: 2024-02-18 | Stop reason: HOSPADM

## 2024-02-16 RX ORDER — DIPHENOXYLATE HYDROCHLORIDE AND ATROPINE SULFATE 2.5; .025 MG/1; MG/1
2 TABLET ORAL EVERY 6 HOURS PRN
Status: DISCONTINUED | OUTPATIENT
Start: 2024-02-16 | End: 2024-02-18 | Stop reason: HOSPADM

## 2024-02-16 RX ORDER — FENTANYL/BUPIVACAINE/NS/PF 2-1250MCG
PLASTIC BAG, INJECTION (ML) INJECTION CONTINUOUS
Status: DISCONTINUED | OUTPATIENT
Start: 2024-02-16 | End: 2024-02-16

## 2024-02-16 RX ORDER — DIPHENHYDRAMINE HYDROCHLORIDE 50 MG/ML
25 INJECTION INTRAMUSCULAR; INTRAVENOUS EVERY 4 HOURS PRN
Status: DISCONTINUED | OUTPATIENT
Start: 2024-02-16 | End: 2024-02-18 | Stop reason: HOSPADM

## 2024-02-16 RX ORDER — DOCUSATE SODIUM 100 MG/1
200 CAPSULE, LIQUID FILLED ORAL 2 TIMES DAILY PRN
Status: DISCONTINUED | OUTPATIENT
Start: 2024-02-16 | End: 2024-02-18 | Stop reason: HOSPADM

## 2024-02-16 RX ORDER — NALOXONE HCL 0.4 MG/ML
0.4 VIAL (ML) INJECTION SEE ADMIN INSTRUCTIONS
Status: DISCONTINUED | OUTPATIENT
Start: 2024-02-16 | End: 2024-02-16

## 2024-02-16 RX ORDER — OXYTOCIN/RINGER'S LACTATE 30/500 ML
334 PLASTIC BAG, INJECTION (ML) INTRAVENOUS ONCE AS NEEDED
Status: DISCONTINUED | OUTPATIENT
Start: 2024-02-16 | End: 2024-02-18 | Stop reason: HOSPADM

## 2024-02-16 RX ORDER — ACETAMINOPHEN 325 MG/1
650 TABLET ORAL EVERY 6 HOURS PRN
Status: DISCONTINUED | OUTPATIENT
Start: 2024-02-16 | End: 2024-02-16

## 2024-02-16 RX ORDER — PENICILLIN G 3000000 [IU]/50ML
3 INJECTION, SOLUTION INTRAVENOUS
Status: DISCONTINUED | OUTPATIENT
Start: 2024-02-16 | End: 2024-02-16

## 2024-02-16 RX ORDER — MUPIROCIN 20 MG/G
OINTMENT TOPICAL
Status: DISCONTINUED | OUTPATIENT
Start: 2024-02-16 | End: 2024-02-16

## 2024-02-16 RX ORDER — LIDOCAINE HYDROCHLORIDE 10 MG/ML
10 INJECTION INFILTRATION; PERINEURAL ONCE AS NEEDED
Status: DISCONTINUED | OUTPATIENT
Start: 2024-02-16 | End: 2024-02-16

## 2024-02-16 RX ORDER — SODIUM CHLORIDE, SODIUM LACTATE, POTASSIUM CHLORIDE, CALCIUM CHLORIDE 600; 310; 30; 20 MG/100ML; MG/100ML; MG/100ML; MG/100ML
INJECTION, SOLUTION INTRAVENOUS CONTINUOUS
Status: DISCONTINUED | OUTPATIENT
Start: 2024-02-16 | End: 2024-02-16

## 2024-02-16 RX ORDER — SODIUM CITRATE AND CITRIC ACID MONOHYDRATE 334; 500 MG/5ML; MG/5ML
30 SOLUTION ORAL ONCE
Status: DISCONTINUED | OUTPATIENT
Start: 2024-02-16 | End: 2024-02-16

## 2024-02-16 RX ORDER — SIMETHICONE 80 MG
1 TABLET,CHEWABLE ORAL EVERY 6 HOURS PRN
Status: DISCONTINUED | OUTPATIENT
Start: 2024-02-16 | End: 2024-02-18 | Stop reason: HOSPADM

## 2024-02-16 RX ORDER — OXYTOCIN/RINGER'S LACTATE 30/500 ML
95 PLASTIC BAG, INJECTION (ML) INTRAVENOUS ONCE AS NEEDED
Status: DISCONTINUED | OUTPATIENT
Start: 2024-02-16 | End: 2024-02-16

## 2024-02-16 RX ORDER — SIMETHICONE 80 MG
1 TABLET,CHEWABLE ORAL 4 TIMES DAILY PRN
Status: DISCONTINUED | OUTPATIENT
Start: 2024-02-16 | End: 2024-02-16

## 2024-02-16 RX ORDER — OXYTOCIN/RINGER'S LACTATE 30/500 ML
95 PLASTIC BAG, INJECTION (ML) INTRAVENOUS ONCE AS NEEDED
Status: DISCONTINUED | OUTPATIENT
Start: 2024-02-16 | End: 2024-02-18 | Stop reason: HOSPADM

## 2024-02-16 RX ORDER — OXYTOCIN/RINGER'S LACTATE 30/500 ML
334 PLASTIC BAG, INJECTION (ML) INTRAVENOUS ONCE
Status: COMPLETED | OUTPATIENT
Start: 2024-02-16 | End: 2024-02-16

## 2024-02-16 RX ORDER — METHYLERGONOVINE MALEATE 0.2 MG/ML
200 INJECTION INTRAVENOUS ONCE AS NEEDED
Status: DISCONTINUED | OUTPATIENT
Start: 2024-02-16 | End: 2024-02-18 | Stop reason: HOSPADM

## 2024-02-16 RX ORDER — DIPHENHYDRAMINE HYDROCHLORIDE 50 MG/ML
12.5 INJECTION INTRAMUSCULAR; INTRAVENOUS EVERY 4 HOURS PRN
Status: DISCONTINUED | OUTPATIENT
Start: 2024-02-16 | End: 2024-02-16

## 2024-02-16 RX ORDER — ACETAMINOPHEN 325 MG/1
650 TABLET ORAL EVERY 6 HOURS SCHEDULED
Status: DISCONTINUED | OUTPATIENT
Start: 2024-02-16 | End: 2024-02-18 | Stop reason: HOSPADM

## 2024-02-16 RX ORDER — BUPIVACAINE HYDROCHLORIDE 2.5 MG/ML
INJECTION, SOLUTION INFILTRATION; PERINEURAL
Status: DISCONTINUED | OUTPATIENT
Start: 2024-02-16 | End: 2024-02-16

## 2024-02-16 RX ORDER — HYDROCORTISONE 25 MG/G
CREAM TOPICAL 3 TIMES DAILY PRN
Status: DISCONTINUED | OUTPATIENT
Start: 2024-02-16 | End: 2024-02-18 | Stop reason: HOSPADM

## 2024-02-16 RX ORDER — OXYTOCIN/RINGER'S LACTATE 30/500 ML
334 PLASTIC BAG, INJECTION (ML) INTRAVENOUS ONCE AS NEEDED
Status: DISCONTINUED | OUTPATIENT
Start: 2024-02-16 | End: 2024-02-16

## 2024-02-16 RX ORDER — EPHEDRINE SULFATE 50 MG/ML
10 INJECTION, SOLUTION INTRAVENOUS EVERY 5 MIN PRN
Status: DISCONTINUED | OUTPATIENT
Start: 2024-02-16 | End: 2024-02-16

## 2024-02-16 RX ORDER — OXYTOCIN/RINGER'S LACTATE 30/500 ML
95 PLASTIC BAG, INJECTION (ML) INTRAVENOUS ONCE
Status: COMPLETED | OUTPATIENT
Start: 2024-02-16 | End: 2024-02-16

## 2024-02-16 RX ADMIN — Medication 10 ML/HR: at 05:02

## 2024-02-16 RX ADMIN — BUPIVACAINE HYDROCHLORIDE 4 ML: 2.5 INJECTION, SOLUTION INFILTRATION; PERINEURAL at 05:02

## 2024-02-16 RX ADMIN — SODIUM CHLORIDE, POTASSIUM CHLORIDE, SODIUM LACTATE AND CALCIUM CHLORIDE: 600; 310; 30; 20 INJECTION, SOLUTION INTRAVENOUS at 09:02

## 2024-02-16 RX ADMIN — ACETAMINOPHEN 650 MG: 325 TABLET, FILM COATED ORAL at 02:02

## 2024-02-16 RX ADMIN — Medication 334 MILLI-UNITS/MIN: at 10:02

## 2024-02-16 RX ADMIN — ACETAMINOPHEN 325MG 650 MG: 325 TABLET ORAL at 11:02

## 2024-02-16 RX ADMIN — Medication: at 02:02

## 2024-02-16 RX ADMIN — SODIUM CHLORIDE, POTASSIUM CHLORIDE, SODIUM LACTATE AND CALCIUM CHLORIDE 1000 ML: 600; 310; 30; 20 INJECTION, SOLUTION INTRAVENOUS at 03:02

## 2024-02-16 RX ADMIN — Medication 95 MILLI-UNITS/MIN: at 02:02

## 2024-02-16 RX ADMIN — PENICILLIN G 3 MILLION UNITS: 3000000 INJECTION, SOLUTION INTRAVENOUS at 08:02

## 2024-02-16 RX ADMIN — DEXTROSE MONOHYDRATE 5 MILLION UNITS: 5 INJECTION INTRAVENOUS at 03:02

## 2024-02-16 RX ADMIN — PRENATAL VITAMINS-IRON FUMARATE 27 MG IRON-FOLIC ACID 0.8 MG TABLET 1 TABLET: at 02:02

## 2024-02-16 RX ADMIN — LIDOCAINE HYDROCHLORIDE,EPINEPHRINE BITARTRATE 4 ML: 15; .005 INJECTION, SOLUTION EPIDURAL; INFILTRATION; INTRACAUDAL; PERINEURAL at 04:02

## 2024-02-16 NOTE — H&P
This  @ 40 2/7 weeks gestation presented to triage with complaints of pain and contractions. Communication was via a . Reports worsening pain and contractions that began earlier this evening and progressively worsened. Denies any complications this pregnancy  Reports +FM, no LOF, no UB, +CTX      Tracing: category 1  VE: /-2    A/P: Labor  -admit  -labs  -IV fluids  -anticipate   -pain management

## 2024-02-16 NOTE — ANESTHESIA PREPROCEDURE EVALUATION
[facility] - Labor and Delivery  Anesthesiology  Labor Epidural    Patient Name: Bennie Escamilla  MRN: [id number]  Admission Date: 2024  Primary Care Provider: Rosario, Primary Doctor  Attending Physician: Herbie Bolanos MD    Subjective:     Patient in labor, requesting epidural.    OB History    Para Term  AB Living   2 1 1 0 0 1   SAB IAB Ectopic Multiple Live Births   0 0 0 0 1      # Outcome Date GA Lbr Dedrick/2nd Weight Sex Delivery Anes PTL Lv   2 Current            1 Term 21   3.062 kg (6 lb 12 oz) F Vag-Spont  N NEHEMIAS     Past Medical History:   Diagnosis Date    Diabetes mellitus     gestational (pt states GDM w/ first pregnacy but not second)     History reviewed. No pertinent surgical history.    PTA Medications   Medication Sig    magnesium oxide (MAG-OX) 400 mg (241.3 mg magnesium) tablet Take 1 tablet (400 mg total) by mouth once daily.    PNV,calcium 72-iron-folic acid (PRENATAL VITAMIN PLUS LOW IRON) 27 mg iron- 1 mg Tab Take 1 tablet (1 each total) by mouth once daily.       Review of patient's allergies indicates:  No Known Allergies     Tobacco Use    Smoking status: Never     Passive exposure: Never    Smokeless tobacco: Never   Substance and Sexual Activity    Alcohol use: Not Currently    Drug use: Never    Sexual activity: Yes     Partners: Male     Review of Systems   Objective:     Vital Signs (Most Recent):  Temp: 36.5 °C (97.7 °F) (24 0300)  Pulse: 84 (24 0313)  BP: 101/69 (24 0259)  SpO2: 98 % (24 0311) Vital Signs (24h Range):  Temp:  [36.5 °C (97.7 °F)] 36.5 °C (97.7 °F)  Pulse:  [74-98] 84  Resp:  [18] 18  SpO2:  [96 %-99 %] 98 %  BP: (101)/(68-69) 101/69        There is no height or weight on file to calculate BMI.    Significant Labs:  Lab Results   Component Value Date    WBC 8.16 2024    HGB 12.7 2024    HCT 37.1 2024    MCV 95.1 (H) 2024     2024       Assessment/Plan:     28 y.o. female   at 40w2d for: Labor epidural    Abhishek Stevenson CRNA  Anesthesiology  [facility] - Labor and Delivery         Pre-op Assessment    I have reviewed the Patient Summary Reports.     I have reviewed the Nursing Notes. I have reviewed the NPO Status.   I have reviewed the Medications.     Review of Systems  Hematology/Oncology:  Hematology Normal   Oncology Normal                                   EENT/Dental:  EENT/Dental Normal           Cardiovascular:  Cardiovascular Normal                                            Pulmonary:  Pulmonary Normal                       Renal/:  Renal/ Normal                 Hepatic/GI:  Hepatic/GI Normal                 Musculoskeletal:  Musculoskeletal Normal                Neurological:  Neurology Normal                                      Endocrine:  Diabetes           Dermatological:  Skin Normal    Psych:  Psychiatric Normal                    Physical Exam  General: Well nourished, Cooperative, Oriented and Alert    Airway:  Mallampati: II   Mouth Opening: Normal  TM Distance: Normal  Tongue: Normal  Neck ROM: Normal ROM        Anesthesia Plan  Type of Anesthesia, risks & benefits discussed:    Anesthesia Type: Epidural  Informed Consent: Informed consent signed with the Patient and all parties understand the risks and agree with anesthesia plan.  All questions answered.   ASA Score: 2  Day of Surgery Review of History & Physical: H&P Update referred to the surgeon/provider.    Ready For Surgery From Anesthesia Perspective.     .

## 2024-02-16 NOTE — ANESTHESIA PROCEDURE NOTES
Epidural    Patient location during procedure: OB   Reason for block: primary anesthetic   Reason for block: labor analgesia requested by patient and obstetrician  Diagnosis: Labor pain relief   Start time: 2/16/2024 4:51 AM  Timeout: 2/16/2024 4:45 AM  End time: 2/16/2024 4:57 AM    Staffing  Performing Provider: Abhishek Stevenson CRNA  Authorizing Provider: Wenceslao Ahmadi MD    Staffing  Performed by: Abhishek Stevenson CRNA  Authorized by: Wenceslao Ahmadi MD        Preanesthetic Checklist  Completed: patient identified, IV checked, site marked, risks and benefits discussed, surgical consent, monitors and equipment checked, pre-op evaluation, timeout performed, anesthesia consent given, hand hygiene performed and patient being monitored  Preparation  Patient position: sitting (Mask and sterile gloves worn)  Prep: ChloraPrep and Pt preloaded with 500 to 1000ml of crystalloid  Patient monitoring: Pulse Ox (Pre & Post procedure vitals & FHR are recorded by the nurse q5mins as appropriate)  Reason for block: primary anesthetic   Epidural  Skin Anesthetic: lidocaine 1% (25G 1.5inch needle)  Skin Wheal: 3 mL  Administration type: continuous  Approach: midline  Interspace: L2-3    Injection technique: BEN saline  Needle and Epidural Catheter  Needle type: Tuohy   Needle gauge: 17  Needle length: 3.5 inches  Needle insertion depth: 5 cm  Catheter type: springwound and multi-orifice  Catheter size: 19 G  Catheter at skin depth: 11 cm  Insertion Attempts: 1  Test dose: 3 mL of lidocaine 1.5% with Epi 1-to-200,000 (Test dose given via epidural catheter)  Additional Documentation: negative aspiration for heme and CSF, no paresthesia on injection, no significant pain on injection, incremental injection, no signs/symptoms of IV or SA injection and no significant complaints from patient (Epidural catheter connected to epidural pump with filter in situ and pt instructed on its use.  Warning label placed on epidural  catheter.)  Needle localization: anatomical landmarks  Assessment  Ease of block: easy  Patient's tolerance of the procedure: comfortable throughout block (Pt returned to supine position with head of bed elevated 30 degrees and MICHAEL applied as needed)  Additional Notes  Pt instructed in use of epidural PCA  Improvement in pain relief documented by L&D nurses  L&D nurses instructed to call if no relief at all after 30 mins No inadvertent dural puncture with Tuohy.  Dural puncture not performed with spinal needle

## 2024-02-16 NOTE — ED PROVIDER NOTES
This  @ 40 2/7 weeks gestation presented to triage with complaints of pain and contractions. Communication was via a . Reports worsening pain and contractions that began earlier this evening and progressively worsened. Denies any complications this pregnancy  Reports +FM, no LOF, no UB, +CTX      Tracing: category 1  VE: /-2    A/P: Labor  -admit  -labs  -IV fluids  -anticipate   -pain management   MDD (major depressive disorder)   F32.9

## 2024-02-16 NOTE — PROGRESS NOTES
I reviewed History, PE, A/P and medical record.  Services provided in outpatient department of a teaching hospital/facility, I was immediately available.  I agree with resident, care reasonable and necessary.   I evaluated the patient with resident at time of visit, participated in key parts of H/P and management was discussed.    Pt has since been admitted early this am in labor    Gina Dickinson MD  [facility] - [place]  [facility]

## 2024-02-16 NOTE — L&D DELIVERY NOTE
[facility] - Labor and Delivery  OBGYN  Operative Note    SUMMARY     Date of Procedure: 2024    Procedure: Spontaneous Vaginal Delivery    Attending: Pa Hurst MD OB Hospitalist  Resident: Dr. Buzz Aleman, Dr. Ingrid Norwood     Pre-Operative Diagnosis:   Patient Active Problem List   Diagnosis    Not immune to rubella    History of gestational diabetes mellitus (GDM)    Positive GBS test    Hypomagnesemia    Abdominal pain affecting pregnancy       Post-Operative Diagnosis:   Same with addition of  Single liveborn infant now s/p  40w2d without complications  2. 2nd degree perineal laceration with repair  3. vaginal laceration with repair    Anesthesia: epidural    Procedure in Detail: With good maternal effort a viable liveborn infant was delivered after approximately 15 minutes of pushing. The fetal head delivered in right occiput transverse position.  Nuchal cord was not present. Remainder of infant delivered without difficulty with shoulder anterior followed by remaining body.    placed on maternal abdomen and bulb suctioning performed. 3VC was cut and clamped after 30 sec delay and cord blood obtained. The vagina, perineum, and cervix were examined. The placenta then delivered spontaneously and was noted to be intact. Lacerations were present. After any necessary repairs were performed, all instruments and sponges were removed from the vagina. Sponge, lap, and needle counts were correct after the procedure.    Repair Suture: 3.0 vicryl    Infant: Liveborn female infant with APGARS of 8/9; 3 vessel umbilical cord. Moving both upper extremities well.   [facility] team present at bedside.    Complications: No    Estimated Blood Loss (EBL): 250 mL           Condition: Mother and baby bonding well      Buzz Aleman MD  [facility]  2024

## 2024-02-17 LAB
BASOPHILS # BLD AUTO: 0.03 X10(3)/MCL
BASOPHILS NFR BLD AUTO: 0.3 %
EOSINOPHIL # BLD AUTO: 0.15 X10(3)/MCL (ref 0–0.9)
EOSINOPHIL NFR BLD AUTO: 1.5 %
ERYTHROCYTE [DISTWIDTH] IN BLOOD BY AUTOMATED COUNT: 13.4 % (ref 11.5–17)
HCT VFR BLD AUTO: 30.7 % (ref 37–47)
HGB BLD-MCNC: 10.5 G/DL (ref 12–16)
IMM GRANULOCYTES # BLD AUTO: 0.06 X10(3)/MCL (ref 0–0.04)
IMM GRANULOCYTES NFR BLD AUTO: 0.6 %
LYMPHOCYTES # BLD AUTO: 2.57 X10(3)/MCL (ref 0.6–4.6)
LYMPHOCYTES NFR BLD AUTO: 26 %
MCH RBC QN AUTO: 32.5 PG (ref 27–31)
MCHC RBC AUTO-ENTMCNC: 34.2 G/DL (ref 33–36)
MCV RBC AUTO: 95 FL (ref 80–94)
MONOCYTES # BLD AUTO: 0.39 X10(3)/MCL (ref 0.1–1.3)
MONOCYTES NFR BLD AUTO: 3.9 %
NEUTROPHILS # BLD AUTO: 6.69 X10(3)/MCL (ref 2.1–9.2)
NEUTROPHILS NFR BLD AUTO: 67.7 %
NRBC BLD AUTO-RTO: 0 %
PLATELET # BLD AUTO: 177 X10(3)/MCL (ref 130–400)
PMV BLD AUTO: 8.9 FL (ref 7.4–10.4)
RBC # BLD AUTO: 3.23 X10(6)/MCL (ref 4.2–5.4)
WBC # SPEC AUTO: 9.89 X10(3)/MCL (ref 4.5–11.5)

## 2024-02-17 PROCEDURE — 25000003 PHARM REV CODE 250

## 2024-02-17 PROCEDURE — 11000001 HC ACUTE MED/SURG PRIVATE ROOM

## 2024-02-17 PROCEDURE — 85025 COMPLETE CBC W/AUTO DIFF WBC: CPT

## 2024-02-17 RX ADMIN — ACETAMINOPHEN 325MG 650 MG: 325 TABLET ORAL at 05:02

## 2024-02-17 RX ADMIN — ACETAMINOPHEN 325MG 650 MG: 325 TABLET ORAL at 11:02

## 2024-02-17 NOTE — PROGRESS NOTES
Postpartum Progress Note    Bennie Escamilla is a 28 y.o.  s/p  currently Post-Partum day 1.      Nurse reports no acute events overnight. Patient reports no abd pain. Patient denies any issues or complaints. Ambulating, voiding, and tolerating regular diet. Passing flatus. Lochia is decreasing. No subjective fever or chills. Pain well controlled with tylenol.  No HA, vision changes, dizziness, N/V, CP, SOB, breast pain or lower extremity pain.  Currently bottle feeding.  Does not desire contraception. Baby in room.      Physical Exam:   Vitals:    24 1406 24 1425 24 1948 24 2348   BP:  117/76 100/64 105/71   Pulse:  74 74 80   Resp:       Temp: 98 °F (36.7 °C) 98.4 °F (36.9 °C) 98.7 °F (37.1 °C) 98.9 °F (37.2 °C)   TempSrc:  Oral Oral Oral   SpO2:           Gen: AAO x 3, NAD, resting in bed comfortably   CV: RRR, S1, S2, no murmurs  Resp: Non labored, lungs CTA bilaterally, good air movement  Abd: fundus firm palpable below the umbilicus, abdomen soft and NT, + BS  Ext: no edema, calves non tender and equal in size  Psych: cooperative, normal affect    Labs:  H/H: 12.7/37.1 to 10.5/30.7      Assessment/Plan  28 y.o. female   Status Post Vaginal delivery PPD/POD#1.     Patient Active Problem List   Diagnosis    Not immune to rubella    History of gestational diabetes mellitus (GDM)    Positive GBS test    Hypomagnesemia    Spontaneous vaginal delivery         Continue routine post-partum/operative care, continue to monitor  Patient does plan to Bottle feed  Continue PNV and Iron.  H/H stable and appropriate  Rubella Immune, Rh Positive, Varicella Immune  Up ad rebekah; Pelvic Rest for 6 weeks.  DVT PPx - Ambulation   Contraception: None  Dispo: Likely stable for discharge home on Postpartum day #2    Patient and Plan discussed with Dr. Jessica Aleman MD  [facility] -[facility]

## 2024-02-17 NOTE — PROGRESS NOTES
Faculty addendum: Patient discussed with resident on day of encounter.  Care provided reasonable and necessary. I participated in the management of the patient and was immediately available throughout the encounter. Services were furnished in a primary care center located in the outpatient department of a [facility]. I agree with the resident's findings and plan as documented in the resident's note.     Cassidy Albarado, DO

## 2024-02-18 VITALS
TEMPERATURE: 99 F | RESPIRATION RATE: 18 BRPM | OXYGEN SATURATION: 97 % | DIASTOLIC BLOOD PRESSURE: 69 MMHG | SYSTOLIC BLOOD PRESSURE: 103 MMHG | HEART RATE: 77 BPM

## 2024-02-18 PROCEDURE — 25000003 PHARM REV CODE 250

## 2024-02-18 RX ORDER — IBUPROFEN 600 MG/1
600 TABLET ORAL EVERY 6 HOURS PRN
Qty: 30 TABLET | Refills: 0 | Status: SHIPPED | OUTPATIENT
Start: 2024-02-18 | End: 2024-05-10

## 2024-02-18 RX ORDER — ACETAMINOPHEN 325 MG/1
650 TABLET ORAL EVERY 6 HOURS PRN
Qty: 30 TABLET | Refills: 0 | Status: ON HOLD | OUTPATIENT
Start: 2024-02-18 | End: 2024-04-28 | Stop reason: HOSPADM

## 2024-02-18 RX ADMIN — PRENATAL VITAMINS-IRON FUMARATE 27 MG IRON-FOLIC ACID 0.8 MG TABLET 1 TABLET: at 08:02

## 2024-02-18 RX ADMIN — Medication: at 11:02

## 2024-02-18 RX ADMIN — ACETAMINOPHEN 325MG 650 MG: 325 TABLET ORAL at 02:02

## 2024-02-18 RX ADMIN — ACETAMINOPHEN 325MG 650 MG: 325 TABLET ORAL at 08:02

## 2024-02-18 NOTE — DISCHARGE SUMMARY
Delivery Discharge Summary  Obstetrics      Primary OB Clinician:  [facility]    Admission date: 2024  Discharge date: 2024    Disposition: To home, self care    Discharge Diagnosis List:      Patient Active Problem List   Diagnosis    Not immune to rubella    History of gestational diabetes mellitus (GDM)    Positive GBS test    Hypomagnesemia    Spontaneous vaginal delivery       Procedure:     Hospital Course:  Bennie Escamilla is a 28 y.o. now , PPD #2 who was admitted on 2024 . Patient was subsequently admitted to labor and delivery unit with signed consents.     Labor course was uncomplicated and resulted in  without complications.     Please see delivery note for further details. Her postpartum course was uncomplicated. On discharge day, patient's pain is controlled with oral pain medications. Pt is tolerating ambulation without SOB or CP, and regular diet without N/V. Reports lochia is mild. Denies any HA, vision changes, F/C, LE swelling. Denies any breast pain/soreness.    Pt in stable condition and ready for discharge. She has been instructed to start and/or continue medications and follow up with her obstetrics provider as listed below.    Pertinent studies:  CBC  Recent Labs   Lab 24  0324 24  0657   WBC 8.16 9.89   HGB 12.7 10.5*   HCT 37.1 30.7*   MCV 95.1* 95.0*    177        Exam:  General: in no acute distress  RESP: clear to auscultation bilaterally, non labored  CV: regular rate and rhythm, no murmurs  ABD: soft, nontender, BS+, Fundus firm below level of umbilicus  Bilateral lower extremity no edema or tenderness      Immunization History   Administered Date(s) Administered    Influenza - Quadrivalent 10/12/2021, 10/12/2021    Influenza - Quadrivalent - PF *Preferred* (6 months and older) 10/10/2023    MMR 2021    Tdap 2021, 2023        Delivery:    Episiotomy: None   Lacerations: 2nd   Repair suture:     Repair # of packets: 1   Blood  "loss (ml):       Birth information:  YOB: 2025   Time of birth: 10:27 AM   Sex: female   Delivery type: Vaginal, Spontaneous   Gestational Age: 40w2d     Measurements    Weight: 3280 g  Weight (lbs): 7 lb 3.7 oz  Length: 50.8 cm  Length (in): 20"  Head circumference: 33 cm         Delivery Clinician: Delivery Providers    Delivering clinician: Pa Hurst MD   Provider Role    Marilee Romero RN Registered Nurse    Anastasiya Jeong RN Registered Nurse    Lora Dailey RN Registered Nurse    Carolyne Woods RN Registered Nurse    Ingrid Norwood MD Resident    Buzz Aleman MD Resident    Stutes, Sofia, RN Registered Nurse             Additional  information:  Forceps:    Vacuum:    Breech:    Observed anomalies      Living?:     Apgars    Living status: Living  Apgar Component Scores:  1 min.:  5 min.:  10 min.:  15 min.:  20 min.:    Skin color:  0  1       Heart rate:  2  2       Reflex irritability:  2  2       Muscle tone:  2  2       Respiratory effort:  2  2       Total:  8  9       Apgars assigned by: TANIA WOODS RN         Placenta: Delivered:       appearance    Patient Instructions:   Current Discharge Medication List        CONTINUE these medications which have NOT CHANGED    Details   PNV,calcium 72-iron-folic acid (PRENATAL VITAMIN PLUS LOW IRON) 27 mg iron- 1 mg Tab Take 1 tablet (1 each total) by mouth once daily.  Qty: 90 tablet, Refills: 1    Associated Diagnoses: 21 weeks gestation of pregnancy             No discharge procedures on file.      Follow-up will be at [facility] family medicine clinic.  Follow-up will be in approximately 6 weeks.  ER precautions were reviewed with the patient and she was given opportunity to ask questions.  Stable for discharge    Buzz Aleman MD  [facility]      "

## 2024-02-19 NOTE — ANESTHESIA POSTPROCEDURE EVALUATION
Anesthesia Post Evaluation    Patient: Bennie Escamilla    Procedure(s) Performed: * No procedures listed *    Final Anesthesia Type: epidural      Patient location during evaluation: floor  Patient participation: Yes- Able to Participate  Level of consciousness: awake and alert  Post-procedure vital signs: reviewed and stable  Pain management: adequate  Airway patency: patent  NICO mitigation strategies: Use of major conduction anesthesia (spinal/epidural) or peripheral nerve block  PONV status at discharge: No PONV  Anesthetic complications: no      Cardiovascular status: hemodynamically stable  Respiratory status: unassisted, spontaneous ventilation and room air  Hydration status: euvolemic  Follow-up not needed.  Comments: Denies problem c spinal or epidural anesthesia               Vitals Value Taken Time   /69 02/18/24 0727   Temp 36.9 °C (98.5 °F) 02/18/24 0727   Pulse 77 02/18/24 0727   Resp 18 02/17/24 1618   SpO2 97 % 02/16/24 1314         No case tracking events are documented in the log.      Pain/Lesa Score: Pain Rating Prior to Med Admin: 2 (2/18/2024  8:05 AM)

## 2024-02-20 ENCOUNTER — OFFICE VISIT (OUTPATIENT)
Dept: FAMILY MEDICINE | Facility: CLINIC | Age: 29
End: 2024-02-20
Payer: MEDICAID

## 2024-02-20 VITALS
BODY MASS INDEX: 21.67 KG/M2 | DIASTOLIC BLOOD PRESSURE: 70 MMHG | HEART RATE: 82 BPM | SYSTOLIC BLOOD PRESSURE: 107 MMHG | HEIGHT: 60 IN | RESPIRATION RATE: 20 BRPM | OXYGEN SATURATION: 99 % | TEMPERATURE: 98 F | WEIGHT: 110.38 LBS

## 2024-02-20 DIAGNOSIS — R21 RASH AND NONSPECIFIC SKIN ERUPTION: Primary | ICD-10-CM

## 2024-02-20 PROCEDURE — 99214 OFFICE O/P EST MOD 30 MIN: CPT | Mod: PBBFAC | Performed by: STUDENT IN AN ORGANIZED HEALTH CARE EDUCATION/TRAINING PROGRAM

## 2024-02-20 RX ORDER — TRIAMCINOLONE ACETONIDE 0.25 MG/G
OINTMENT TOPICAL 2 TIMES DAILY
Qty: 15 G | Refills: 0 | Status: SHIPPED | OUTPATIENT
Start: 2024-02-20 | End: 2024-03-05 | Stop reason: DRUGHIGH

## 2024-02-20 NOTE — PROGRESS NOTES
[facility] Family Medicine Office Visit Note    Subjective:       Patient ID: Bennie Escamilla is a 28 y.o. female.    Chief Complaint: Rash (Onset 2 days after giving birth, noted to groin)      HPI:  28 y.o. female  status postpartum after Labor/Delivery: Uncomplicated vaginal delivery at 40w2d on 2024. Presents to [facility] clinic for onset of rash.     [id number] used for duration of visit    Rash onset since delivery of baby 2 days ago. No drainage or pain. +itch. Had similar presentation after delivery of last pregnancy. Was rx'd an ointment. Rash affects lower abd and buttock area. Has not tried to apply anything on it. Denies any change in soaps, however, noting usage of new pad since delivery and thinks it is too thick and feels stuffy.     Review of pregnancy course; pt with +GBS, hypomagnesemia. No other complications. Lochia decreasing. Currently formula feeding. Denies any HA, SOB, chest pain, nausea, vomiting, or pain. Feels like she is bonding with baby well.     Review of Systems:  Gen: denies fever and chills  Resp: denies cough, shortness of breath and wheezing  CV: denies chest pain and palpitations  GI: denies nausea, vomiting, abdominal pain and change in bowel habits  Skin: see above hpi    Objective:      /70 (BP Location: Right arm, Patient Position: Sitting, BP Method: Large (Automatic))   Pulse 82   Temp 98 °F (36.7 °C) (Oral)   Resp 20   Ht 5' (1.524 m)   Wt 50.1 kg (110 lb 6.4 oz)   LMP 2023 (Approximate)   SpO2 99%   BMI 21.56 kg/m²   Physical Exam:  Gen: alert and oriented, NAD  CV: RRR, S1 and S2 present, no murmurs appreciated on exam  Resp: CTA bilaterally  Abd: Soft, non-distended, non-tender, bowel sounds normoactive  Skin: diffuse papular rash over lower abd and upper buttock. No discharge or drainage. +excoriations. Pt declined examination of lower groin area.        Current Outpatient Medications:     acetaminophen (TYLENOL) 325 MG tablet, Take  2 tablets (650 mg total) by mouth every 6 (six) hours as needed for Pain., Disp: 30 tablet, Rfl: 0    ibuprofen (ADVIL,MOTRIN) 600 MG tablet, Take 1 tablet (600 mg total) by mouth every 6 (six) hours as needed for Pain., Disp: 30 tablet, Rfl: 0    PNV,calcium 72-iron-folic acid (PRENATAL VITAMIN PLUS LOW IRON) 27 mg iron- 1 mg Tab, Take 1 tablet (1 each total) by mouth once daily., Disp: 90 tablet, Rfl: 1    triamcinolone acetonide 0.025% (KENALOG) 0.025 % Oint, Apply topically 2 (two) times daily., Disp: 15 g, Rfl: 0        Assessment/Plan:     1. Rash and nonspecific skin eruption      Orders Placed This Encounter    triamcinolone acetonide 0.025% (KENALOG) 0.025 % Oint      Ddx includes contact dermatitis vs prurigo of pregnancy given recent delivery of child vs other allergic reaction. On chart review, pt was dx'd contact dermatitis and rx'd topical steroid. Will trial low dose topical steroid. If persists, consider further eval for biliary causes vs other causes of rash such as tinea infections    Follow up in about 2 weeks (around 3/5/2024) for f/u rash.

## 2024-02-23 ENCOUNTER — TELEPHONE (OUTPATIENT)
Dept: FAMILY MEDICINE | Facility: CLINIC | Age: 29
End: 2024-02-23
Payer: MEDICAID

## 2024-02-23 RX ORDER — CLOTRIMAZOLE AND BETAMETHASONE DIPROPIONATE 10; .64 MG/G; MG/G
CREAM TOPICAL 2 TIMES DAILY
Qty: 45 G | Refills: 0 | Status: ON HOLD | OUTPATIENT
Start: 2024-02-23 | End: 2024-04-28 | Stop reason: HOSPADM

## 2024-02-23 NOTE — TELEPHONE ENCOUNTER
----- Message from Latonia Jay LPN sent at 2/23/2024 10:49 AM CST -----    ----- Message -----  From: Elysia Flores  Sent: 2/22/2024  11:18 AM CST  To: Latonia Jay LPN    Pt seen [facility] for a rash on 02/20/2024  and they stated the medication is not working.  triamcinolone acetonide 0.025% (KENALOG) 0.025 % Oint  They stated she was on a medication 2 years ago for a rash and was requesting that medication be sent it. The medication they would like to be sent in is  Clotrimazole-Betamethasone

## 2024-02-23 NOTE — TELEPHONE ENCOUNTER
Per chart review, pt had similar presentation, finally resolved with usage of Lotrisone in the past. Triamcinolone not working, will trial lotrisone

## 2024-02-26 NOTE — PROGRESS NOTES
Faculty Attestation: Bennie Escamilla  was seen in Family Medicine Clinic. Patient chart reviewed. History of Present Illness, Physical Exam, and Assessment and Plan reviewed. Treatment plan is reasonable and appropriate. Compliance with treatment recommendations is important.         Vane Baron MD  Sports Medicine

## 2024-02-29 NOTE — PROGRESS NOTES
FETAL ASSESSMENT REPORT    RE: Bennie Escamilla  MRN:  [id number]  :  1995  AGE:  28 y.o.    Date:  2/15/2024    REFERRAL PHYSICIAN: Family Medicine Clinic    Allergies: Patient has no known allergies.    Bennie is a 28 y.o.  at 40w2d gestational age here today for a NST.    2024, by Ultrasound    MEDICATIONS AT TIME OF TEST:    Current Outpatient Medications   Medication Sig Dispense Refill    acetaminophen (TYLENOL) 325 MG tablet Take 2 tablets (650 mg total) by mouth every 6 (six) hours as needed for Pain. 30 tablet 0    clotrimazole-betamethasone 1-0.05% (LOTRISONE) cream Apply topically 2 (two) times daily. 45 g 0    ibuprofen (ADVIL,MOTRIN) 600 MG tablet Take 1 tablet (600 mg total) by mouth every 6 (six) hours as needed for Pain. 30 tablet 0    PNV,calcium 72-iron-folic acid (PRENATAL VITAMIN PLUS LOW IRON) 27 mg iron- 1 mg Tab Take 1 tablet (1 each total) by mouth once daily. 90 tablet 1    triamcinolone acetonide 0.025% (KENALOG) 0.025 % Oint Apply topically 2 (two) times daily. 15 g 0     No current facility-administered medications for this visit.       Indication: post-dates pregnancy    Interpretation:  150 BPM baseline    Variability:  Moderate    Accelerations:  Present    Decelerations:  None    Fetal Movement: Yes      Assessment: Reactive NST    Plan: Follow-up one week for repeat NST        Ifeanyi Womack MD  2024; 9:45 AM    Late note entry, I was available and involved at the time of encounter.

## 2024-03-05 ENCOUNTER — OFFICE VISIT (OUTPATIENT)
Dept: FAMILY MEDICINE | Facility: CLINIC | Age: 29
End: 2024-03-05
Payer: MEDICAID

## 2024-03-05 VITALS
SYSTOLIC BLOOD PRESSURE: 113 MMHG | WEIGHT: 104.81 LBS | BODY MASS INDEX: 20.58 KG/M2 | DIASTOLIC BLOOD PRESSURE: 80 MMHG | RESPIRATION RATE: 18 BRPM | OXYGEN SATURATION: 97 % | TEMPERATURE: 98 F | HEART RATE: 96 BPM | HEIGHT: 60 IN

## 2024-03-05 DIAGNOSIS — D64.9 ANEMIA, UNSPECIFIED TYPE: ICD-10-CM

## 2024-03-05 DIAGNOSIS — R21 RASH AND NONSPECIFIC SKIN ERUPTION: Primary | ICD-10-CM

## 2024-03-05 LAB
ALBUMIN SERPL-MCNC: 4 G/DL (ref 3.5–5)
ALBUMIN/GLOB SERPL: 1 RATIO (ref 1.1–2)
ALP SERPL-CCNC: 86 UNIT/L (ref 40–150)
ALT SERPL-CCNC: 36 UNIT/L (ref 0–55)
AST SERPL-CCNC: 34 UNIT/L (ref 5–34)
BASOPHILS # BLD AUTO: 0.08 X10(3)/MCL
BASOPHILS NFR BLD AUTO: 1.1 %
BILIRUB SERPL-MCNC: 0.4 MG/DL
BUN SERPL-MCNC: 14.6 MG/DL (ref 7–18.7)
CALCIUM SERPL-MCNC: 9.3 MG/DL (ref 8.4–10.2)
CHLORIDE SERPL-SCNC: 109 MMOL/L (ref 98–107)
CO2 SERPL-SCNC: 23 MMOL/L (ref 22–29)
CREAT SERPL-MCNC: 0.79 MG/DL (ref 0.55–1.02)
EOSINOPHIL # BLD AUTO: 0.64 X10(3)/MCL (ref 0–0.9)
EOSINOPHIL NFR BLD AUTO: 8.5 %
ERYTHROCYTE [DISTWIDTH] IN BLOOD BY AUTOMATED COUNT: 12.1 % (ref 11.5–17)
GFR SERPLBLD CREATININE-BSD FMLA CKD-EPI: >60 MLS/MIN/1.73/M2
GLOBULIN SER-MCNC: 4.1 GM/DL (ref 2.4–3.5)
GLUCOSE SERPL-MCNC: 82 MG/DL (ref 74–100)
HCT VFR BLD AUTO: 39.4 % (ref 37–47)
HGB BLD-MCNC: 13.6 G/DL (ref 12–16)
IMM GRANULOCYTES # BLD AUTO: 0.02 X10(3)/MCL (ref 0–0.04)
IMM GRANULOCYTES NFR BLD AUTO: 0.3 %
LYMPHOCYTES # BLD AUTO: 2.85 X10(3)/MCL (ref 0.6–4.6)
LYMPHOCYTES NFR BLD AUTO: 37.8 %
MCH RBC QN AUTO: 32.2 PG (ref 27–31)
MCHC RBC AUTO-ENTMCNC: 34.5 G/DL (ref 33–36)
MCV RBC AUTO: 93.4 FL (ref 80–94)
MONOCYTES # BLD AUTO: 0.34 X10(3)/MCL (ref 0.1–1.3)
MONOCYTES NFR BLD AUTO: 4.5 %
NEUTROPHILS # BLD AUTO: 3.61 X10(3)/MCL (ref 2.1–9.2)
NEUTROPHILS NFR BLD AUTO: 47.8 %
NRBC BLD AUTO-RTO: 0 %
PLATELET # BLD AUTO: 306 X10(3)/MCL (ref 130–400)
PMV BLD AUTO: 10.2 FL (ref 7.4–10.4)
POTASSIUM SERPL-SCNC: 4.2 MMOL/L (ref 3.5–5.1)
PROT SERPL-MCNC: 8.1 GM/DL (ref 6.4–8.3)
RBC # BLD AUTO: 4.22 X10(6)/MCL (ref 4.2–5.4)
SODIUM SERPL-SCNC: 142 MMOL/L (ref 136–145)
WBC # SPEC AUTO: 7.54 X10(3)/MCL (ref 4.5–11.5)

## 2024-03-05 PROCEDURE — 85025 COMPLETE CBC W/AUTO DIFF WBC: CPT | Performed by: STUDENT IN AN ORGANIZED HEALTH CARE EDUCATION/TRAINING PROGRAM

## 2024-03-05 PROCEDURE — 36415 COLL VENOUS BLD VENIPUNCTURE: CPT | Performed by: STUDENT IN AN ORGANIZED HEALTH CARE EDUCATION/TRAINING PROGRAM

## 2024-03-05 PROCEDURE — 99214 OFFICE O/P EST MOD 30 MIN: CPT | Mod: PBBFAC | Performed by: STUDENT IN AN ORGANIZED HEALTH CARE EDUCATION/TRAINING PROGRAM

## 2024-03-05 PROCEDURE — 80053 COMPREHEN METABOLIC PANEL: CPT | Performed by: STUDENT IN AN ORGANIZED HEALTH CARE EDUCATION/TRAINING PROGRAM

## 2024-03-05 RX ORDER — TRIAMCINOLONE ACETONIDE 1 MG/G
OINTMENT TOPICAL 2 TIMES DAILY
Qty: 30 G | Refills: 0 | Status: ON HOLD | OUTPATIENT
Start: 2024-03-05 | End: 2024-04-28 | Stop reason: HOSPADM

## 2024-03-05 NOTE — PROGRESS NOTES
[facility] Family Medicine Office Visit Note    Subjective:       Patient ID: Bennie Escamilla is a 28 y.o. female.    Chief Complaint: Postpartum Care    HPI:  28 y.o. female  status postpartum after Labor/Delivery: Uncomplicated vaginal delivery at 40^2wga on 2024. Presents to [facility] clinic for postpartum f/u and f/u rash.     was offered, however pt declined.  interpreted for pt instead.    Rash on lower abd and upper buttock resolved with Lotrisone, however, over last few days, pt developed new rash to bilat hands - they start has 1mm papules, then evolve into slightly raised red patches. They do not itch or have discharge/drainage. Denies pain. No changes to soaps, detergents, clothes. Denies any new exposures or pets. No h/o atopy. Not worse at any specific time of day. No one else in home with similar sxs. Pt has mostly been spending time on phone, relaxing in bed since delivery. Denies any bug bites.    Today :  - Breast or bottle feeding: formula. Pt requesting for medication to assist with stopping lactation as pt is formula feeding. States lactation has progressively decreased since delivery, but not ceased. Last pregnancy, it stopped after 1-2wks.  - Physical signs- lochia- almost gone  - Denies- HA, SOB, chest pain, Dizziness, fatigue, nausea, vomiting. Feeling well. Denies feeling down or anxious.   - Post partum contraception: none  - Social support-  - Nutrition and PNV's-  - Post partum work -  - ER Precautions discussed-    Review of Systems:  Gen: denies fever and chills  Resp: denies cough, shortness of breath and wheezing  CV: denies chest pain and palpitations  GI: denies nausea, vomiting, abdominal pain and change in bowel habits  MSK: denies joint pain and decreased ROM  Neuro: denies numbness, tingling, headache and dizziness  Skin: denies rash, lesions, and easy bleeding/bruising    Objective:      /80 (BP Location: Right arm, Patient Position:  Sitting, BP Method: Medium (Automatic))   Pulse 96   Temp 97.5 °F (36.4 °C) (Oral)   Resp 18   Ht 5' (1.524 m)   Wt 47.5 kg (104 lb 12.8 oz)   LMP 04/26/2023 (Approximate)   SpO2 97%   Breastfeeding No   BMI 20.47 kg/m²   Physical Exam:  Gen: alert and oriented, NAD  CV: RRR, S1 and S2 present, no murmurs appreciated on exam  Resp: CTA bilaterally  Abd: Soft, non-distended, non-tender, bowel sounds normoactive  Skin: mildly red patches noted to R hand - notably on dorsal surface of 5th proximal phalanx, 3rd proximal phalanx, and near CMC joint, 1mm papule to L 3rd digit. See images below. Rash is nontender and does not itch.            Current Outpatient Medications:     acetaminophen (TYLENOL) 325 MG tablet, Take 2 tablets (650 mg total) by mouth every 6 (six) hours as needed for Pain., Disp: 30 tablet, Rfl: 0    clotrimazole-betamethasone 1-0.05% (LOTRISONE) cream, Apply topically 2 (two) times daily., Disp: 45 g, Rfl: 0    ibuprofen (ADVIL,MOTRIN) 600 MG tablet, Take 1 tablet (600 mg total) by mouth every 6 (six) hours as needed for Pain., Disp: 30 tablet, Rfl: 0    PNV,calcium 72-iron-folic acid (PRENATAL VITAMIN PLUS LOW IRON) 27 mg iron- 1 mg Tab, Take 1 tablet (1 each total) by mouth once daily., Disp: 90 tablet, Rfl: 1    triamcinolone acetonide 0.1% (KENALOG) 0.1 % ointment, Apply topically 2 (two) times daily., Disp: 30 g, Rfl: 0        Assessment/Plan:     1. Rash and nonspecific skin eruption    2. Anemia, unspecified type    3. Postpartum care and examination       - doing well  - contraception: will f/u at 6wk postpartum visit. Pt declines contraception at thistime. Discussed continued pelvic rest until 6wk appt.  - discussed that lactation will cease spontaneously with continued avoidance of stimulation to nipple and breast. Given that it has progressively decreased, likely stop in next 1-2 wks. Do not recommend prescription of medications at this time. Will cont to monitor for persistence at  f/u visit.  - discussed using fragrance and dye-free soaps; usage of lotion after showering. Trial triamcinolone. Will check CMP to assess liver/gallbladder. Consider bile acids pending lab results  - H/o anemia after delivery - will recheck CBC for recovery.

## 2024-03-08 ENCOUNTER — PATIENT MESSAGE (OUTPATIENT)
Dept: FAMILY MEDICINE | Facility: CLINIC | Age: 29
End: 2024-03-08
Payer: MEDICAID

## 2024-04-02 ENCOUNTER — OFFICE VISIT (OUTPATIENT)
Dept: FAMILY MEDICINE | Facility: CLINIC | Age: 29
End: 2024-04-02
Payer: MEDICAID

## 2024-04-02 VITALS
SYSTOLIC BLOOD PRESSURE: 114 MMHG | HEART RATE: 58 BPM | HEIGHT: 60 IN | WEIGHT: 103 LBS | TEMPERATURE: 98 F | OXYGEN SATURATION: 98 % | DIASTOLIC BLOOD PRESSURE: 78 MMHG | BODY MASS INDEX: 20.22 KG/M2

## 2024-04-02 PROCEDURE — 99213 OFFICE O/P EST LOW 20 MIN: CPT | Mod: PBBFAC

## 2024-04-02 NOTE — PROGRESS NOTES
Chief Complaint  Chief Complaint   Patient presents with    Postpartum Care    Rash     Pt states a rash develop in her face x 3-4 days ago.         History of Present Illness  Bennie Escamilla is a 29 y.o.  S/P  Post-Partum here at clinic for 6 wk f/u visit.    Pt is s/p  at 40 week 2day gestation on 2024. Apgars 8/9. No issues during the pregnancy or delivery.. Patient is doing well today; has support at home and is currently Bottle feeding.   She denies depressed mood or suicidal/homicidal ideations. States infant is Home, doing well.     Acute complaints:  1) Facial rash   Onset 4 days ago   Located underneath right eye  She states area is mildly pruritic and has been applying OTC hydrocortisone 2% over area  Denies use of any new detergents/facial products    2) Body Skin Rash - resolved  Was initially seen in clinic on 24 for 2 wk postpartum f/u diffuse papular rash over lower abd and upper buttock   Was treated with clotrimazole-betamethasone (Lotrisone) 1-0.05% topical cream   Seen in f/u on 24 for rash in clinic   Rash on lower abd and upper buttock resolved   Had onset of nonpruritic/nontender rash on on dorsal surface of 5th proximal phalanx, 3rd proximal phalanx, and near CMC joint, 1mm papule to L 3rd digit  Informed about Kenalog trial 0.1% cream for treatment  Resolved      ROS:  - see HPI    Physical Exam:  Vitals:    24 1405   BP: 114/78   Pulse: (!) 58   Temp: 97.9 °F (36.6 °C)     General:  VSS, afebrile. No acute distress.   Respiratory: Non labored.  No coughing.  Cardiovascular:  No peripheral edema.  DP pulses palpable bilaterally.   Skin: mild erythema located below right eye; nontender/noninflamed, no signs of skin breakdown     [place]  Depression Scale: 0      Medication List with Changes/Refills   Current Medications    ACETAMINOPHEN (TYLENOL) 325 MG TABLET    Take 2 tablets (650 mg total) by mouth every 6 (six) hours as needed for Pain.     CLOTRIMAZOLE-BETAMETHASONE 1-0.05% (LOTRISONE) CREAM    Apply topically 2 (two) times daily.    IBUPROFEN (ADVIL,MOTRIN) 600 MG TABLET    Take 1 tablet (600 mg total) by mouth every 6 (six) hours as needed for Pain.    PNV,CALCIUM 72-IRON-FOLIC ACID (PRENATAL VITAMIN PLUS LOW IRON) 27 MG IRON- 1 MG TAB    Take 1 tablet (1 each total) by mouth once daily.    TRIAMCINOLONE ACETONIDE 0.1% (KENALOG) 0.1 % OINTMENT    Apply topically 2 (two) times daily.         Assessment / Plan:    Status postpartum routine postpartum follow-up  Doing well, instructed patient on proper wound care, to keep the incision area dry and clean   Educated pt on signs and sx of post partum blues, post partum depression and post partum psychosis.   Plans to bottlefeed  Contraception: not desired at this time    Facial rash  No signs of skin breakdown or signs inflammation present  Inform patient stopped using over-the-counter hydrocortisone 2% cream  Informed patient to keep the area moisturized      Follow up:     RTC: as needed; patient states they are getting a new insurance soon and is expected to establish care with a new PCP in coming weeks    Greg Cheatham MD     \[facility]\"" Family Medicine Resident HO-1  04/02/2024

## 2024-04-05 ENCOUNTER — OFFICE VISIT (OUTPATIENT)
Dept: FAMILY MEDICINE | Facility: CLINIC | Age: 29
End: 2024-04-05
Payer: MEDICAID

## 2024-04-05 VITALS
DIASTOLIC BLOOD PRESSURE: 77 MMHG | OXYGEN SATURATION: 97 % | HEIGHT: 60 IN | BODY MASS INDEX: 19.75 KG/M2 | SYSTOLIC BLOOD PRESSURE: 116 MMHG | RESPIRATION RATE: 18 BRPM | TEMPERATURE: 98 F | WEIGHT: 100.63 LBS | HEART RATE: 70 BPM

## 2024-04-05 DIAGNOSIS — N36.1 URETHRAL DIVERTICULUM: ICD-10-CM

## 2024-04-05 DIAGNOSIS — N89.8 VAGINAL DISCHARGE: Primary | ICD-10-CM

## 2024-04-05 DIAGNOSIS — Z75.8 DOES NOT HAVE PRIMARY CARE PROVIDER: ICD-10-CM

## 2024-04-05 LAB
C TRACH DNA SPEC QL NAA+PROBE: NOT DETECTED
CLUE CELLS VAG QL WET PREP: ABNORMAL
N GONORRHOEA DNA SPEC QL NAA+PROBE: NOT DETECTED
SOURCE (OHS): NORMAL
T VAGINALIS VAG QL WET PREP: ABNORMAL
WBC #/AREA VAG WET PREP: ABNORMAL
YEAST SPEC QL WET PREP: ABNORMAL

## 2024-04-05 PROCEDURE — 99215 OFFICE O/P EST HI 40 MIN: CPT | Mod: PBBFAC | Performed by: STUDENT IN AN ORGANIZED HEALTH CARE EDUCATION/TRAINING PROGRAM

## 2024-04-05 PROCEDURE — 87591 N.GONORRHOEAE DNA AMP PROB: CPT | Performed by: STUDENT IN AN ORGANIZED HEALTH CARE EDUCATION/TRAINING PROGRAM

## 2024-04-05 PROCEDURE — 87210 SMEAR WET MOUNT SALINE/INK: CPT | Performed by: STUDENT IN AN ORGANIZED HEALTH CARE EDUCATION/TRAINING PROGRAM

## 2024-04-06 NOTE — PROGRESS NOTES
I reviewed History, PE, A/P and medical record.  Services provided in outpatient department of a teaching hospital/facility, I was immediately available.  I agree with resident, care reasonable and necessary.   I evaluated the patient with resident at time of visit, participated in key parts of H/P and management was discussed.    Gina Dickinson MD  [facility] Family Medicine Residency - [facility]

## 2024-04-23 ENCOUNTER — PATIENT MESSAGE (OUTPATIENT)
Dept: EMERGENCY MEDICINE | Facility: HOSPITAL | Age: 29
End: 2024-04-23
Payer: MEDICAID

## 2024-04-23 ENCOUNTER — HOSPITAL ENCOUNTER (INPATIENT)
Facility: HOSPITAL | Age: 29
LOS: 5 days | Discharge: HOME | DRG: 872 | End: 2024-04-28
Attending: EMERGENCY MEDICINE | Admitting: INTERNAL MEDICINE
Payer: MEDICAID

## 2024-04-23 DIAGNOSIS — A41.9 SEPSIS WITHOUT ACUTE ORGAN DYSFUNCTION, DUE TO UNSPECIFIED ORGANISM: Primary | ICD-10-CM

## 2024-04-23 DIAGNOSIS — N12 PYELONEPHRITIS: ICD-10-CM

## 2024-04-23 PROBLEM — N10 ACUTE PYELONEPHRITIS DUE TO BACTERIA: Status: ACTIVE | Noted: 2024-04-23

## 2024-04-23 PROBLEM — R78.81 BACTEREMIA: Status: ACTIVE | Noted: 2024-04-23

## 2024-04-23 PROBLEM — B96.89 ACUTE PYELONEPHRITIS DUE TO BACTERIA: Status: ACTIVE | Noted: 2024-04-23

## 2024-04-23 LAB
ANION GAP SERPL CALC-SCNC: 9 MEQ/L
BASOPHILS # BLD AUTO: 0.03 X10(3)/MCL
BASOPHILS NFR BLD AUTO: 0.3 %
BUN SERPL-MCNC: 7.1 MG/DL (ref 7–18.7)
CALCIUM SERPL-MCNC: 8.9 MG/DL (ref 8.4–10.2)
CHLORIDE SERPL-SCNC: 102 MMOL/L (ref 98–107)
CO2 SERPL-SCNC: 27 MMOL/L (ref 22–29)
CREAT SERPL-MCNC: 0.72 MG/DL (ref 0.55–1.02)
CREAT/UREA NIT SERPL: 10
EOSINOPHIL # BLD AUTO: 0 X10(3)/MCL (ref 0–0.9)
EOSINOPHIL NFR BLD AUTO: 0 %
ERYTHROCYTE [DISTWIDTH] IN BLOOD BY AUTOMATED COUNT: 12.3 % (ref 11.5–17)
GFR SERPLBLD CREATININE-BSD FMLA CKD-EPI: >60 MLS/MIN/1.73/M2
GLUCOSE SERPL-MCNC: 125 MG/DL (ref 74–100)
HCT VFR BLD AUTO: 34.5 % (ref 37–47)
HGB BLD-MCNC: 11.5 G/DL (ref 12–16)
IMM GRANULOCYTES # BLD AUTO: 0.2 X10(3)/MCL (ref 0–0.04)
IMM GRANULOCYTES NFR BLD AUTO: 2.2 %
LACTATE SERPL-SCNC: 0.8 MMOL/L (ref 0.5–2.2)
LYMPHOCYTES # BLD AUTO: 0.95 X10(3)/MCL (ref 0.6–4.6)
LYMPHOCYTES NFR BLD AUTO: 10.7 %
MCH RBC QN AUTO: 30.3 PG (ref 27–31)
MCHC RBC AUTO-ENTMCNC: 33.3 G/DL (ref 33–36)
MCV RBC AUTO: 90.8 FL (ref 80–94)
MONOCYTES # BLD AUTO: 0.25 X10(3)/MCL (ref 0.1–1.3)
MONOCYTES NFR BLD AUTO: 2.8 %
NEUTROPHILS # BLD AUTO: 7.48 X10(3)/MCL (ref 2.1–9.2)
NEUTROPHILS NFR BLD AUTO: 84 %
NRBC BLD AUTO-RTO: 0 %
PLATELET # BLD AUTO: 269 X10(3)/MCL (ref 130–400)
PMV BLD AUTO: 8.7 FL (ref 7.4–10.4)
POTASSIUM SERPL-SCNC: 3.2 MMOL/L (ref 3.5–5.1)
RBC # BLD AUTO: 3.8 X10(6)/MCL (ref 4.2–5.4)
SODIUM SERPL-SCNC: 138 MMOL/L (ref 136–145)
WBC # SPEC AUTO: 8.91 X10(3)/MCL (ref 4.5–11.5)

## 2024-04-23 PROCEDURE — 80048 BASIC METABOLIC PNL TOTAL CA: CPT | Performed by: EMERGENCY MEDICINE

## 2024-04-23 PROCEDURE — 87040 BLOOD CULTURE FOR BACTERIA: CPT | Performed by: EMERGENCY MEDICINE

## 2024-04-23 PROCEDURE — 87086 URINE CULTURE/COLONY COUNT: CPT | Performed by: EMERGENCY MEDICINE

## 2024-04-23 PROCEDURE — 25000003 PHARM REV CODE 250: Performed by: EMERGENCY MEDICINE

## 2024-04-23 PROCEDURE — 63600175 PHARM REV CODE 636 W HCPCS: Performed by: EMERGENCY MEDICINE

## 2024-04-23 PROCEDURE — 96365 THER/PROPH/DIAG IV INF INIT: CPT

## 2024-04-23 PROCEDURE — 11000001 HC ACUTE MED/SURG PRIVATE ROOM

## 2024-04-23 PROCEDURE — 99285 EMERGENCY DEPT VISIT HI MDM: CPT | Mod: 25

## 2024-04-23 PROCEDURE — 83605 ASSAY OF LACTIC ACID: CPT | Performed by: EMERGENCY MEDICINE

## 2024-04-23 PROCEDURE — 85025 COMPLETE CBC W/AUTO DIFF WBC: CPT | Performed by: EMERGENCY MEDICINE

## 2024-04-23 RX ORDER — SODIUM CHLORIDE 0.9 % (FLUSH) 0.9 %
10 SYRINGE (ML) INJECTION EVERY 8 HOURS PRN
Status: DISCONTINUED | OUTPATIENT
Start: 2024-04-23 | End: 2024-04-28 | Stop reason: HOSPADM

## 2024-04-23 RX ORDER — ACETAMINOPHEN 325 MG/1
650 TABLET ORAL EVERY 4 HOURS PRN
Status: DISCONTINUED | OUTPATIENT
Start: 2024-04-23 | End: 2024-04-28 | Stop reason: HOSPADM

## 2024-04-23 RX ORDER — SODIUM CHLORIDE 0.9 % (FLUSH) 0.9 %
10 SYRINGE (ML) INJECTION
Status: DISCONTINUED | OUTPATIENT
Start: 2024-04-23 | End: 2024-04-28 | Stop reason: HOSPADM

## 2024-04-23 RX ORDER — SODIUM CHLORIDE 9 MG/ML
INJECTION, SOLUTION INTRAVENOUS CONTINUOUS
Status: ACTIVE | OUTPATIENT
Start: 2024-04-24 | End: 2024-04-24

## 2024-04-23 RX ORDER — ONDANSETRON 4 MG/1
8 TABLET, ORALLY DISINTEGRATING ORAL EVERY 8 HOURS PRN
Status: DISCONTINUED | OUTPATIENT
Start: 2024-04-23 | End: 2024-04-28 | Stop reason: HOSPADM

## 2024-04-23 RX ORDER — TALC
6 POWDER (GRAM) TOPICAL NIGHTLY PRN
Status: DISCONTINUED | OUTPATIENT
Start: 2024-04-23 | End: 2024-04-28 | Stop reason: HOSPADM

## 2024-04-23 RX ORDER — ONDANSETRON HYDROCHLORIDE 2 MG/ML
4 INJECTION, SOLUTION INTRAVENOUS EVERY 6 HOURS PRN
Status: DISCONTINUED | OUTPATIENT
Start: 2024-04-23 | End: 2024-04-28 | Stop reason: HOSPADM

## 2024-04-23 RX ORDER — HYDROCODONE BITARTRATE AND ACETAMINOPHEN 5; 325 MG/1; MG/1
1 TABLET ORAL EVERY 6 HOURS PRN
Status: DISCONTINUED | OUTPATIENT
Start: 2024-04-23 | End: 2024-04-28 | Stop reason: HOSPADM

## 2024-04-23 RX ADMIN — SODIUM CHLORIDE: 9 INJECTION, SOLUTION INTRAVENOUS at 11:04

## 2024-04-23 RX ADMIN — CEFTRIAXONE SODIUM 1 G: 1 INJECTION, POWDER, FOR SOLUTION INTRAMUSCULAR; INTRAVENOUS at 08:04

## 2024-04-23 RX ADMIN — SODIUM CHLORIDE 1000 ML: 9 INJECTION, SOLUTION INTRAVENOUS at 08:04

## 2024-04-24 LAB
BASOPHILS # BLD AUTO: 0.02 X10(3)/MCL
BASOPHILS NFR BLD AUTO: 0.3 %
EOSINOPHIL # BLD AUTO: 0.02 X10(3)/MCL (ref 0–0.9)
EOSINOPHIL NFR BLD AUTO: 0.3 %
ERYTHROCYTE [DISTWIDTH] IN BLOOD BY AUTOMATED COUNT: 12.4 % (ref 11.5–17)
HCT VFR BLD AUTO: 29.9 % (ref 37–47)
HGB BLD-MCNC: 10 G/DL (ref 12–16)
IMM GRANULOCYTES # BLD AUTO: 0.11 X10(3)/MCL (ref 0–0.04)
IMM GRANULOCYTES NFR BLD AUTO: 1.4 %
LYMPHOCYTES # BLD AUTO: 1.63 X10(3)/MCL (ref 0.6–4.6)
LYMPHOCYTES NFR BLD AUTO: 20.7 %
MCH RBC QN AUTO: 30.4 PG (ref 27–31)
MCHC RBC AUTO-ENTMCNC: 33.4 G/DL (ref 33–36)
MCV RBC AUTO: 90.9 FL (ref 80–94)
MONOCYTES # BLD AUTO: 0.33 X10(3)/MCL (ref 0.1–1.3)
MONOCYTES NFR BLD AUTO: 4.2 %
NEUTROPHILS # BLD AUTO: 5.77 X10(3)/MCL (ref 2.1–9.2)
NEUTROPHILS NFR BLD AUTO: 73.1 %
NRBC BLD AUTO-RTO: 0 %
PLATELET # BLD AUTO: 252 X10(3)/MCL (ref 130–400)
PMV BLD AUTO: 8.8 FL (ref 7.4–10.4)
RBC # BLD AUTO: 3.29 X10(6)/MCL (ref 4.2–5.4)
WBC # SPEC AUTO: 7.88 X10(3)/MCL (ref 4.5–11.5)

## 2024-04-24 PROCEDURE — 25000003 PHARM REV CODE 250: Performed by: INTERNAL MEDICINE

## 2024-04-24 PROCEDURE — 85025 COMPLETE CBC W/AUTO DIFF WBC: CPT | Performed by: INTERNAL MEDICINE

## 2024-04-24 PROCEDURE — 11000001 HC ACUTE MED/SURG PRIVATE ROOM

## 2024-04-24 PROCEDURE — 36415 COLL VENOUS BLD VENIPUNCTURE: CPT | Performed by: INTERNAL MEDICINE

## 2024-04-24 PROCEDURE — 99900031 HC PATIENT EDUCATION (STAT)

## 2024-04-24 PROCEDURE — 94761 N-INVAS EAR/PLS OXIMETRY MLT: CPT

## 2024-04-24 PROCEDURE — 63600175 PHARM REV CODE 636 W HCPCS: Performed by: INTERNAL MEDICINE

## 2024-04-24 RX ADMIN — CEFTRIAXONE 2 G: 2 INJECTION, POWDER, FOR SOLUTION INTRAMUSCULAR; INTRAVENOUS at 11:04

## 2024-04-24 NOTE — PLAN OF CARE
Problem: Adult Inpatient Plan of Care  Goal: Plan of Care Review  Outcome: Progressing  Flowsheets (Taken 4/23/2024 2302)  Plan of Care Reviewed With: patient  Goal: Absence of Hospital-Acquired Illness or Injury  Outcome: Progressing  Intervention: Identify and Manage Fall Risk  Flowsheets (Taken 4/23/2024 2302)  Safety Promotion/Fall Prevention:   assistive device/personal item within reach   nonskid shoes/socks when out of bed   lighting adjusted   medications reviewed  Intervention: Prevent and Manage VTE (Venous Thromboembolism) Risk  Flowsheets (Taken 4/23/2024 2302)  VTE Prevention/Management:   ambulation promoted   fluids promoted   ROM (active) performed   remove, assess skin, and reapply sequential compression device     Problem: Adult Inpatient Plan of Care  Goal: Absence of Hospital-Acquired Illness or Injury  Outcome: Progressing  Intervention: Identify and Manage Fall Risk  Flowsheets (Taken 4/23/2024 2302)  Safety Promotion/Fall Prevention:   assistive device/personal item within reach   nonskid shoes/socks when out of bed   lighting adjusted   medications reviewed  Intervention: Prevent and Manage VTE (Venous Thromboembolism) Risk  Flowsheets (Taken 4/23/2024 2302)  VTE Prevention/Management:   ambulation promoted   fluids promoted   ROM (active) performed   remove, assess skin, and reapply sequential compression device     Problem: Adult Inpatient Plan of Care  Goal: Plan of Care Review  Outcome: Progressing  Flowsheets (Taken 4/23/2024 2302)  Plan of Care Reviewed With: patient  Goal: Absence of Hospital-Acquired Illness or Injury  Outcome: Progressing  Intervention: Identify and Manage Fall Risk  Flowsheets (Taken 4/23/2024 2302)  Safety Promotion/Fall Prevention:   assistive device/personal item within reach   nonskid shoes/socks when out of bed   lighting adjusted   medications reviewed  Intervention: Prevent and Manage VTE (Venous Thromboembolism) Risk  Flowsheets (Taken 4/23/2024 2302)  VTE  Prevention/Management:   ambulation promoted   fluids promoted   ROM (active) performed   remove, assess skin, and reapply sequential compression device     Problem: Infection  Goal: Absence of Infection Signs and Symptoms  Outcome: Progressing  Intervention: Prevent or Manage Infection  Flowsheets (Taken 4/23/2024 4247)  Fever Reduction/Comfort Measures:   lightweight clothing   fluid intake increased

## 2024-04-24 NOTE — ED TRIAGE NOTES
Pt was called to come back in due to positive bld cultures. Was dx with kidney infection last night.

## 2024-04-24 NOTE — H&P
[facility] - Emergency Dept    History & Physical      Patient Name: Bennie Escamilla  MRN: [id number]  Admission Date: 4/23/2024  Attending Physician: Luis Mcmillan MD  Primary Care Provider: Rosario, Primary Doctor         Patient information was obtained from patient, spouse/SO, and ER records.     Subjective:     Principal Problem:Acute pyelonephritis due to bacteria    Chief Complaint:   Chief Complaint   Patient presents with    Fever    Dizziness        HPI:  29-year-old female with a recent history of gestational diabetes and recent childbirth 2 months prior who initially presented to ED yesterday with complaints of flank pain and was diagnosed with the acute pyelonephritis and was discharged on oral antibiotics (cefpodoxime) the after receiving Rocephin IV x1 patient was called back to ED today given blood culture results of Gram-negative rods thus far.  Upon arrival to ED she noted improvement to her flank pain have febrile illness with temp of 102°.  Persistent fever be and bacteremia patient is being admitted we will continue IV antibiotic administration until final results of blood cultures with no she denies any nausea or vomiting or diarrhea at this time.  Patient is Chinese and only speaks Mandarin so it assistance of a professional  was attempted with given technical difficulties with speech the patient's spouse who was at bedside assisted with translation.    Past Medical History:   Diagnosis Date    Diabetes mellitus     gestational (pt states GDM w/ first pregnacy but not second)       No past surgical history on file.    Review of patient's allergies indicates:  No Known Allergies    Current Facility-Administered Medications   Medication Dose Route Frequency Provider Last Rate Last Admin    sodium chloride 0.9% flush 10 mL  10 mL Intravenous PRN Ezequiel Valadez MD         Current Outpatient Medications   Medication Sig Dispense Refill    acetaminophen (TYLENOL) 325 MG  tablet Take 2 tablets (650 mg total) by mouth every 6 (six) hours as needed for Pain. 30 tablet 0    cefpodoxime (VANTIN) 100 MG tablet Take 2 tablets (200 mg total) by mouth every 12 (twelve) hours. for 10 days 40 tablet 0    clotrimazole-betamethasone 1-0.05% (LOTRISONE) cream Apply topically 2 (two) times daily. 45 g 0    ibuprofen (ADVIL,MOTRIN) 600 MG tablet Take 1 tablet (600 mg total) by mouth every 6 (six) hours as needed for Pain. 30 tablet 0    ondansetron (ZOFRAN) 4 MG tablet Take 1 tablet (4 mg total) by mouth every 6 (six) hours. 12 tablet 0    PNV,calcium 72-iron-folic acid (PRENATAL VITAMIN PLUS LOW IRON) 27 mg iron- 1 mg Tab Take 1 tablet (1 each total) by mouth once daily. 90 tablet 1    triamcinolone acetonide 0.1% (KENALOG) 0.1 % ointment Apply topically 2 (two) times daily. 30 g 0     Family History    None       Tobacco Use    Smoking status: Never     Passive exposure: Never    Smokeless tobacco: Never   Substance and Sexual Activity    Alcohol use: Not Currently    Drug use: Never    Sexual activity: Yes     Partners: Male     Review of Systems   Constitutional:  Positive for chills, fatigue and fever.   Neurological:  Positive for weakness.     Objective:     Vital Signs (Most Recent):  Temp: 99.9 °F (37.7 °C) (04/23/24 1959)  Pulse: 87 (04/23/24 2109)  Resp: 18 (04/23/24 1959)  BP: 98/60 (04/23/24 2059)  SpO2: 98 % (04/23/24 2109) Vital Signs (24h Range):  Temp:  [99.9 °F (37.7 °C)] 99.9 °F (37.7 °C)  Pulse:  [86-96] 87  Resp:  [18] 18  SpO2:  [98 %-100 %] 98 %  BP: ()/(60-78) 98/60     Weight: 44.5 kg (98 lb)  Body mass index is 18.52 kg/m².    Physical Exam  Constitutional:       Appearance: Normal appearance.   HENT:      Head: Normocephalic and atraumatic.      Mouth/Throat:      Mouth: Mucous membranes are dry.   Eyes:      Extraocular Movements: Extraocular movements intact.      Pupils: Pupils are equal, round, and reactive to light.   Cardiovascular:      Rate and Rhythm:  Normal rate and regular rhythm.      Pulses: Normal pulses.      Heart sounds: Normal heart sounds.   Pulmonary:      Breath sounds: Normal breath sounds.   Abdominal:      Tenderness: There is abdominal tenderness. There is guarding.   Musculoskeletal:         General: Normal range of motion.      Cervical back: Neck supple.   Skin:     General: Skin is warm and dry.      Capillary Refill: Capillary refill takes less than 2 seconds.   Neurological:      General: No focal deficit present.      Mental Status: She is alert and oriented to person, place, and time.      Motor: Weakness present.   Psychiatric:         Behavior: Behavior normal.           CRANIAL NERVES     CN III, IV, VI   Pupils are equal, round, and reactive to light.      Significant Labs:     Significant Imaging: I have reviewed all pertinent imaging results/findings within the past 24 hours.    Assessment/Plan:     Active Diagnoses:    Diagnosis Date Noted POA    PRINCIPAL PROBLEM:  Acute pyelonephritis due to bacteria [N10, B96.89] 04/23/2024 Yes    Bacteremia [R78.81] 04/23/2024 Yes      Problems Resolved During this Admission:     VTE Risk Mitigation (From admission, onward)           Ordered     Place MCKENNA hose  Until discontinued         04/23/24 2107     IP VTE LOW RISK PATIENT  Once         04/23/24 2107                  Sepsis   with febrile episode and source of infection  Lactic acid 2.7 on 04/22/2024 nausea 0.7  Bolus IVF per protocol given in ED  Continue normal saline at 1:25 a.m. per hour  Patient was called back in to ED with blood cultures growing Gram-negative rods  Resume Rocephin 1 g Q 24    Acute Pyelonephritis:  with leukocytosis, fever on initial presentation probable  IVF resuscitation  Empiric abx as above    Hypokalemia:  Replace and recheck  Will cont to replete prn  Goal K+ > 4.0    Recent gestational diabetes:  Blood glucose 125  Continue to monitor bolus were  Hypoglycemic protocol        Code: FULL   PPX:  BSCDs        The patient is expected to have a LOS more than 2 midnights and will be admitted to inpatient status.      Service was provided using HIPAA compliant web platform using SOC for audio/visual equipment.  Patient location: Hospital  Provider Location: [place], [place]  Participants on call: Bedside RN, Patient  Consent was obtained and the patient was seen with nurse assiting from the bedside.     Luis Mcmillan MD  Department of Hospital Medicine   [facility] - Emergency Dept

## 2024-04-24 NOTE — ED PROVIDER NOTES
Encounter Date: 4/23/2024       History     Chief Complaint   Patient presents with    Fever    Dizziness     29-year-old female no past medical history presents after being called for Gram-negative rods in her blood.  Patient was seen here yesterday for fever and back pain.  She was diagnosed with pyelonephritis and sent home with cefpodoxime.  Patient continues to run fever her so she was told to come back and be admitted for IV antibiotics.  Patient says her back pain has improved and she denies urinary symptoms as well as nausea, vomiting.  T-max today was 102    The history is provided by the patient and the spouse. The history is limited by a language barrier. A  was used.     Review of patient's allergies indicates:  No Known Allergies  Past Medical History:   Diagnosis Date    Diabetes mellitus     gestational (pt states GDM w/ first pregnacy but not second)     No past surgical history on file.  No family history on file.  Social History     Tobacco Use    Smoking status: Never     Passive exposure: Never    Smokeless tobacco: Never   Substance Use Topics    Alcohol use: Not Currently    Drug use: Never     Review of Systems   Constitutional:  Positive for chills, fatigue and fever. Negative for diaphoresis.   HENT:  Negative for congestion, drooling, ear discharge, ear pain, postnasal drip, rhinorrhea, sinus pressure, sinus pain, sore throat and tinnitus.    Eyes:  Negative for discharge.   Respiratory:  Negative for cough, chest tightness, shortness of breath and wheezing.    Cardiovascular:  Negative for chest pain and palpitations.   Gastrointestinal:  Negative for abdominal pain, diarrhea, nausea and vomiting.   Genitourinary:  Negative for frequency, hematuria and urgency.   Musculoskeletal:  Positive for back pain. Negative for neck pain and neck stiffness.   Skin:  Negative for rash.   Neurological:  Negative for syncope, weakness, light-headedness, numbness and headaches.    Psychiatric/Behavioral:  Negative for suicidal ideas.        Physical Exam     Initial Vitals [04/23/24 1959]   BP Pulse Resp Temp SpO2   102/78 96 18 99.9 °F (37.7 °C) 98 %      MAP       --         Physical Exam    Constitutional: No distress.   HENT:   Mouth/Throat: Oropharynx is clear and moist.   Eyes: Conjunctivae are normal.   Neck: Neck supple.   Cardiovascular:  Regular rhythm.           Pulmonary/Chest: Breath sounds normal. No respiratory distress.   Abdominal: Abdomen is soft. Bowel sounds are normal. There is abdominal tenderness.   No cva tenderness There is guarding.   Musculoskeletal:         General: Normal range of motion.      Cervical back: Neck supple.     Neurological: She is alert and oriented to person, place, and time. She has normal strength. No sensory deficit.   Skin: Skin is warm and dry.         ED Course   Procedures  Labs Reviewed   BASIC METABOLIC PANEL - Abnormal; Notable for the following components:       Result Value    Potassium Level 3.2 (*)     Glucose Level 125 (*)     All other components within normal limits   CBC WITH DIFFERENTIAL - Abnormal; Notable for the following components:    RBC 3.80 (*)     Hgb 11.5 (*)     Hct 34.5 (*)     IG# 0.20 (*)     All other components within normal limits   LACTIC ACID, PLASMA - Normal   BLOOD CULTURE [facility]   CULTURE, URINE   CBC W/ AUTO DIFFERENTIAL    Narrative:     The following orders were created for panel order CBC auto differential.  Procedure                               Abnormality         Status                     ---------                               -----------         ------                     CBC with Differential[[id number]]       Abnormal            Final result                 Please view results for these tests on the individual orders.          Imaging Results    None          Medications   sodium chloride 0.9% flush 10 mL (has no administration in time range)   sodium chloride 0.9% flush 10 mL (has no  administration in time range)   acetaminophen tablet 650 mg (has no administration in time range)   HYDROcodone-acetaminophen 5-325 mg per tablet 1 tablet (has no administration in time range)   ondansetron disintegrating tablet 8 mg (has no administration in time range)   ondansetron injection 4 mg (has no administration in time range)   melatonin tablet 6 mg (has no administration in time range)   cefTRIAXone (Rocephin) 1 g in dextrose 5 % in water (D5W) 100 mL IVPB (MB+) (has no administration in time range)   0.9%  NaCl infusion ( Intravenous Canceled Entry 4/24/24 0030)   sodium chloride 0.9% bolus 1,000 mL 1,000 mL (1,000 mLs Intravenous New Bag 4/23/24 2029)   cefTRIAXone (Rocephin) 1 g in dextrose 5 % in water (D5W) 100 mL IVPB (MB+) (1 g Intravenous New Bag 4/23/24 2051)     Medical Decision Making  Medical Decision Making  Problem list/ differential diagnosis including but not limited to:  Sepsis, pyelonephritis      Patient's chronic illnesses impacting care:  none      Diagnostic test considered but not ordered:      My interpretations:      Radiology reports      Discussion of case with external qualified healthcare professionals:  Hospitalist    Review of external notes( inpt, ems, NH, clinic):  4/22/24 ED visit; patient given a dose of ceftriaxone.  She was sent home with cefpodoxime.  CT during this visit shows pyelonephritis.  Blood cultures were positive for Gram-negative rods.    Decision rules/scores:    Medications reviewed:  Medications ordered in the ER:  Discharge prescriptions:    Social variables possible impacting patient's healthcare:    Code status/discussion    Shared decision making:    Consideration for admission versus discharge:  Admission    Amount and/or Complexity of Data Reviewed  Labs: ordered.    Risk  Prescription drug management.  Decision regarding hospitalization.                                      Clinical Impression:  Final diagnoses:  [A41.9] Sepsis without acute  organ dysfunction, due to unspecified organism (Primary)  [N12] Pyelonephritis          ED Disposition Condition    Admit Stable                Ezequiel Valadez MD  04/24/24 0135

## 2024-04-24 NOTE — PROGRESS NOTES
[facility] Hospital Medicine Progress Note      Patient Name: Bennie Escamilla  MRN: [id number]  Admission Date: 4/23/2024   Length of Stay: 1  Attending Physician: Geremias Glass MD  Primary Care Provider: Rosario, Primary Doctor  Face-to-Face encounter date: 04/24/2024    Code Status: Full Code        Chief Complaint:   Fever and Dizziness        HPI:   29-year-old female with a recent history of gestational diabetes and recent childbirth 2 months prior who initially presented to ED yesterday with complaints of flank pain and was diagnosed with the acute pyelonephritis and was discharged on oral antibiotics (cefpodoxime) the after receiving Rocephin IV x1 patient was called back to ED today given blood culture results of Gram-negative rods thus far.  Upon arrival to ED she noted improvement to her flank pain have febrile illness with temp of 102°.  Persistent fever and bacteremia patient is being admitted we will continue IV antibiotic administration until final results of blood cultures with no she denies any nausea or vomiting or diarrhea at this time.  Patient is Chinese and only speaks Mandarin so it assistance of a professional  was attempted with given technical difficulties with speech the patient's spouse who was at bedside assisted with translation.     Overview/Hospital Course:  No notes on file       Interval Hx:   The patient feels better. Denies fever, chills or sweats. No N/V/D. No abd pain. Her  is at the bedside and translated along with using a translation bernardo. They reported she has a hx of bladder prolapse for the last month after her child was born.    Review of Systems   All other systems reviewed and are negative.      Objective/physical exam:  General: In no acute distress, afebrile  Chest: Clear to auscultation bilaterally  Heart: RRR, +S1, S2, no appreciable murmur  Abdomen: Soft, nontender, BS +  MSK: Warm, no lower extremity edema, no  clubbing or cyanosis  GYN -stage 1 bladder prolapse  Neurologic: Alert and oriented x4  Psych/mental status: Appropriate mood and affect. Responds appropriately to questions.     VITAL SIGNS: 24 HRS MIN & MAX LAST   Temp  Min: 98 °F (36.7 °C)  Max: 99.9 °F (37.7 °C) 98 °F (36.7 °C)   BP  Min: 95/61  Max: 108/71 108/71   Pulse  Min: 71  Max: 96  71   Resp  Min: 18  Max: 18 18   SpO2  Min: 94 %  Max: 100 % 97 %       Recent Labs   Lab 04/22/24 2213 04/23/24 2021 04/24/24 0524   WBC 13.35* 8.91 7.88   RBC 4.28 3.80* 3.29*   HGB 12.8 11.5* 10.0*   HCT 38.9 34.5* 29.9*   MCV 90.9 90.8 90.9   MCH 29.9 30.3 30.4   MCHC 32.9* 33.3 33.4   RDW 12.5 12.3 12.4    269 252   MPV 8.7 8.7 8.8       Recent Labs   Lab 04/22/24 2213 04/23/24 2021    138   K 3.6 3.2*   CO2 23 27   BUN 10.1 7.1   CREATININE 0.96 0.72   CALCIUM 9.2 8.9   ALBUMIN 3.2*  --    ALKPHOS 116  --    ALT 25  --    AST 27  --    BILITOT 0.6  --         Microbiology Results (last 7 days)       Procedure Component Value Units Date/Time    Blood culture #1 **CANNOT BE ORDERED STAT** [[id number]] Collected: 04/23/24 2201    Order Status: Resulted Specimen: Blood from Hand, Left Updated: 04/23/24 2201    Urine culture High Risk **CANNOT BE ORDERED STAT** [[id number]] Collected: 04/23/24 2050    Order Status: Sent Specimen: Urine, Clean Catch Updated: 04/23/24 2051             Radiology:  CT Abdomen Pelvis With IV Contrast NO Oral Contrast  Narrative: EXAMINATION:  CT ABDOMEN PELVIS WITH IV CONTRAST    CLINICAL HISTORY:  Abdominal abscess/infection suspected;    TECHNIQUE:  Low dose axial images, sagittal and coronal reformations were obtained from the lung bases to the pubic symphysis following the IV administration of 100 mL of Omnipaque 350 no oral contrast was given.    Automatic exposure control (AEC) was utilized for dose reduction.    Dose: 172 mGycm    COMPARISON:  None.    FINDINGS:  There are mild atelectatic changes in the lung bases.   Liver appears normal.  Spleen appears normal.  Pancreas appears normal.  Biliary system appears normal.  The adrenals are not enlarged.  There are several areas of decreased cortical enhancement in the right kidney with a single questionable area in the left kidney this is suggestive of pyelonephritis.  Aorta shows no evidence of an aneurysm.  Uterus appears normal.  Impression: Changes consistent with pyelonephritis of the right kidney in questionable area of pyelonephritis on the left.    [employer] concordance    Electronically signed by: Wang Mayen MD  Date:    04/23/2024  Time:    08:26      Scheduled Med:  Current Facility-Administered Medications   Medication Dose Route Frequency    cefTRIAXone (Rocephin) IV (PEDS and ADULTS)  2 g Intravenous Q24H            Nutrition Status:      Assessment/Plan:    Ecoli Bacteremia  Sepsis  Right Acute pyelonephritis  Bladder prolapse, stage 1  Hypokalemia    Plan  Cont rocephin  Repeat blood cultures in am  Routine am labs            All diagnosis and differential diagnosis have been reviewed; assessment and plan has been documented; I have personally reviewed the labs and test results that are presently available; I have reviewed the patients medication list; I have reviewed the consulting providers response and recommendations. I have reviewed or attempted to review medical records based upon their availability      _____________________________________________________________________            Geremias Glass MD   04/24/2024

## 2024-04-24 NOTE — NURSING
Nurses Note -- 4 Eyes      4/23/2024   11:07 PM      Skin assessed during: Admit      [x] No Altered Skin Integrity Present    [x]Prevention Measures Documented      [] Yes- Altered Skin Integrity Present or Discovered   [] LDA Added if Not in Epic (Describe Wound)   [] New Altered Skin Integrity was Present on Admit and Documented in LDA   [] Wound Image Taken    Wound Care Consulted? No    Attending Nurse:  Andria Triplett RN/Staff Member:   Jeff Keenan RN

## 2024-04-25 LAB
ALBUMIN SERPL-MCNC: 2.6 G/DL (ref 3.5–5)
BUN SERPL-MCNC: 10.6 MG/DL (ref 7–18.7)
CALCIUM SERPL-MCNC: 8.8 MG/DL (ref 8.4–10.2)
CHLORIDE SERPL-SCNC: 107 MMOL/L (ref 98–107)
CO2 SERPL-SCNC: 25 MMOL/L (ref 22–29)
CREAT SERPL-MCNC: 0.6 MG/DL (ref 0.55–1.02)
ERYTHROCYTE [DISTWIDTH] IN BLOOD BY AUTOMATED COUNT: 12.1 % (ref 11.5–17)
GFR SERPLBLD CREATININE-BSD FMLA CKD-EPI: >60 MLS/MIN/1.73/M2
GLUCOSE SERPL-MCNC: 93 MG/DL (ref 74–100)
HCT VFR BLD AUTO: 30.6 % (ref 37–47)
HGB BLD-MCNC: 10 G/DL (ref 12–16)
MAGNESIUM SERPL-MCNC: 1.5 MG/DL (ref 1.6–2.6)
MCH RBC QN AUTO: 29.7 PG (ref 27–31)
MCHC RBC AUTO-ENTMCNC: 32.7 G/DL (ref 33–36)
MCV RBC AUTO: 90.8 FL (ref 80–94)
NRBC BLD AUTO-RTO: 0 %
PHOSPHATE SERPL-MCNC: 4.6 MG/DL (ref 2.3–4.7)
PLATELET # BLD AUTO: 297 X10(3)/MCL (ref 130–400)
PMV BLD AUTO: 9.1 FL (ref 7.4–10.4)
POTASSIUM SERPL-SCNC: 3.1 MMOL/L (ref 3.5–5.1)
RBC # BLD AUTO: 3.37 X10(6)/MCL (ref 4.2–5.4)
SODIUM SERPL-SCNC: 142 MMOL/L (ref 136–145)
WBC # SPEC AUTO: 5.73 X10(3)/MCL (ref 4.5–11.5)

## 2024-04-25 PROCEDURE — 36415 COLL VENOUS BLD VENIPUNCTURE: CPT | Performed by: INTERNAL MEDICINE

## 2024-04-25 PROCEDURE — 83735 ASSAY OF MAGNESIUM: CPT | Performed by: INTERNAL MEDICINE

## 2024-04-25 PROCEDURE — 25000003 PHARM REV CODE 250: Performed by: INTERNAL MEDICINE

## 2024-04-25 PROCEDURE — 11000001 HC ACUTE MED/SURG PRIVATE ROOM

## 2024-04-25 PROCEDURE — 63600175 PHARM REV CODE 636 W HCPCS: Performed by: INTERNAL MEDICINE

## 2024-04-25 PROCEDURE — 87040 BLOOD CULTURE FOR BACTERIA: CPT | Performed by: INTERNAL MEDICINE

## 2024-04-25 PROCEDURE — 80069 RENAL FUNCTION PANEL: CPT | Performed by: INTERNAL MEDICINE

## 2024-04-25 PROCEDURE — 85027 COMPLETE CBC AUTOMATED: CPT | Performed by: INTERNAL MEDICINE

## 2024-04-25 RX ORDER — POTASSIUM CHLORIDE 20 MEQ/1
20 TABLET, EXTENDED RELEASE ORAL 2 TIMES DAILY
Status: DISCONTINUED | OUTPATIENT
Start: 2024-04-25 | End: 2024-04-27

## 2024-04-25 RX ORDER — LANOLIN ALCOHOL/MO/W.PET/CERES
400 CREAM (GRAM) TOPICAL 2 TIMES DAILY
Status: DISCONTINUED | OUTPATIENT
Start: 2024-04-25 | End: 2024-04-28 | Stop reason: HOSPADM

## 2024-04-25 RX ADMIN — Medication 400 MG: at 08:04

## 2024-04-25 RX ADMIN — CEFTRIAXONE 2 G: 2 INJECTION, POWDER, FOR SOLUTION INTRAMUSCULAR; INTRAVENOUS at 11:04

## 2024-04-25 RX ADMIN — POTASSIUM CHLORIDE 20 MEQ: 1500 TABLET, EXTENDED RELEASE ORAL at 08:04

## 2024-04-25 NOTE — PROGRESS NOTES
[facility] Hospital Medicine Progress Note      Patient Name: Bennie Escamilla  MRN: [id number]  Admission Date: 4/23/2024   Length of Stay: 2  Attending Physician: Geremias Glass MD  Primary Care Provider: Rosario, Primary Doctor  Face-to-Face encounter date: 04/25/2024    Code Status: Full Code        Chief Complaint:   Fever and Dizziness        HPI:   29-year-old female with a recent history of gestational diabetes and recent childbirth 2 months prior who initially presented to ED yesterday with complaints of flank pain and was diagnosed with the acute pyelonephritis and was discharged on oral antibiotics (cefpodoxime) the after receiving Rocephin IV x1 patient was called back to ED today given blood culture results of Gram-negative rods thus far.  Upon arrival to ED she noted improvement to her flank pain have febrile illness with temp of 102°.  Persistent fever and bacteremia patient is being admitted we will continue IV antibiotic administration until final results of blood cultures with no she denies any nausea or vomiting or diarrhea at this time.  Patient is Chinese and only speaks Mandarin so it assistance of a professional  was attempted with given technical difficulties with speech the patient's spouse who was at bedside assisted with translation.     Overview/Hospital Course:  No notes on file       Interval Hx:   The patient has no c/o. All questions answered via translation bernardo.    Review of Systems   All other systems reviewed and are negative.      Objective/physical exam:  General: In no acute distress, afebrile  Chest: Clear to auscultation bilaterally  Heart: RRR, +S1, S2, no appreciable murmur  Abdomen: Soft, nontender, BS +  MSK: Warm, dry  Neurologic: Alert and oriented x4  Psych/mental status: Appropriate mood and affect. Responds appropriately to questions.     VITAL SIGNS: 24 HRS MIN & MAX LAST   Temp  Min: 97.5 °F (36.4 °C)  Max: 98.3 °F  (36.8 °C) 98.1 °F (36.7 °C)   BP  Min: 102/65  Max: 119/73 (!) 103/54   Pulse  Min: 50  Max: 61  (!) 55   Resp  Min: 18  Max: 18 18   SpO2  Min: 95 %  Max: 99 % 99 %       Recent Labs   Lab 04/23/24 2021 04/24/24 0524 04/25/24  0515   WBC 8.91 7.88 5.73   RBC 3.80* 3.29* 3.37*   HGB 11.5* 10.0* 10.0*   HCT 34.5* 29.9* 30.6*   MCV 90.8 90.9 90.8   MCH 30.3 30.4 29.7   MCHC 33.3 33.4 32.7*   RDW 12.3 12.4 12.1    252 297   MPV 8.7 8.8 9.1       Recent Labs   Lab 04/22/24 2213 04/23/24 2021 04/25/24  0515    138 142   K 3.6 3.2* 3.1*   CO2 23 27 25   BUN 10.1 7.1 10.6   CREATININE 0.96 0.72 0.60   CALCIUM 9.2 8.9 8.8   MG  --   --  1.50*   ALBUMIN 3.2*  --  2.6*   ALKPHOS 116  --   --    ALT 25  --   --    AST 27  --   --    BILITOT 0.6  --   --         Microbiology Results (last 7 days)       Procedure Component Value Units Date/Time    Urine culture High Risk **CANNOT BE ORDERED STAT** [[id number]] Collected: 04/23/24 2050    Order Status: Completed Specimen: Urine, Clean Catch Updated: 04/25/24 0631     Urine Culture No Growth At 24 Hours    Blood Culture [[id number]] Collected: 04/25/24 0610    Order Status: Resulted Specimen: Blood from Hand, Right Updated: 04/25/24 0618    Blood Culture [[id number]] Collected: 04/25/24 0605    Order Status: Resulted Specimen: Blood from Antecubital, Right Updated: 04/25/24 0617    Blood culture #1 **CANNOT BE ORDERED STAT** [[id number]]  (Normal) Collected: 04/23/24 2201    Order Status: Completed Specimen: Blood from Hand, Left Updated: 04/25/24 0009     CULTURE, BLOOD ([facility]) No Growth At 24 Hours             Radiology:  CT Abdomen Pelvis With IV Contrast NO Oral Contrast  Narrative: EXAMINATION:  CT ABDOMEN PELVIS WITH IV CONTRAST    CLINICAL HISTORY:  Abdominal abscess/infection suspected;    TECHNIQUE:  Low dose axial images, sagittal and coronal reformations were obtained from the lung bases to the pubic symphysis following the IV administration of 100 mL of  Omnipaque 350 no oral contrast was given.    Automatic exposure control (AEC) was utilized for dose reduction.    Dose: 172 mGycm    COMPARISON:  None.    FINDINGS:  There are mild atelectatic changes in the lung bases.  Liver appears normal.  Spleen appears normal.  Pancreas appears normal.  Biliary system appears normal.  The adrenals are not enlarged.  There are several areas of decreased cortical enhancement in the right kidney with a single questionable area in the left kidney this is suggestive of pyelonephritis.  Aorta shows no evidence of an aneurysm.  Uterus appears normal.  Impression: Changes consistent with pyelonephritis of the right kidney in questionable area of pyelonephritis on the left.    [employer] concordance    Electronically signed by: Wang Mayen MD  Date:    04/23/2024  Time:    08:26      Scheduled Med:  Current Facility-Administered Medications   Medication Dose Route Frequency    cefTRIAXone (Rocephin) IV (PEDS and ADULTS)  2 g Intravenous Q24H            Nutrition Status:      Assessment/Plan:    Ecoli Bacteremia  Sepsis  Right Acute pyelonephritis  Bladder prolapse, stage 1  Hypokalemia  Hypomag    Plan  Replace K and mag  Cont rocephin  F/u Repeat blood cultures drawn today  I recommended pt do pelvic floor exercises to help with her bladder prolapse            All diagnosis and differential diagnosis have been reviewed; assessment and plan has been documented; I have personally reviewed the labs and test results that are presently available; I have reviewed the patients medication list; I have reviewed the consulting providers response and recommendations. I have reviewed or attempted to review medical records based upon their availability      _____________________________________________________________________            Geremias Glass MD   04/25/2024

## 2024-04-25 NOTE — PROGRESS NOTES
Inpatient Nutrition Evaluation    Admit Date: 4/23/2024   Total duration of encounter: 2 days   Patient Age: 29 y.o.    Nutrition Recommendation/Prescription     Continue regular diet as tolerated  Will provide Ensure Plus TID (350 kcal and 13 gm protein)  Weigh weekly      Nutrition Assessment     Chart Review    Reason Seen: continuous nutrition monitoring    Malnutrition Screening Tool Results   Have you recently lost weight without trying?: Yes: 2-13 lbs  Have you been eating poorly because of a decreased appetite?: No   MST Score: 1   Diagnosis:  E.coli bacteremia, sepsis, right acute pylenephritis, hypokalemia, bladder prolapse (stage 1)    Relevant Medical History:   Gestational diabetes with first pregnancy     Scheduled Medications:  cefTRIAXone (Rocephin) IV (PEDS and ADULTS), 2 g, Q24H    Continuous Infusions:   PRN Medications:   Current Facility-Administered Medications:     acetaminophen, 650 mg, Oral, Q4H PRN    HYDROcodone-acetaminophen, 1 tablet, Oral, Q6H PRN    melatonin, 6 mg, Oral, Nightly PRN    ondansetron, 8 mg, Oral, Q8H PRN    ondansetron, 4 mg, Intravenous, Q6H PRN    sodium chloride 0.9%, 10 mL, Intravenous, PRN    sodium chloride 0.9%, 10 mL, Intravenous, Q8H PRN    Recent Labs   Lab 04/22/24 2213 04/23/24 2021 04/24/24  0524 04/25/24  0515    138  --  142   K 3.6 3.2*  --  3.1*   CALCIUM 9.2 8.9  --  8.8   PHOS  --   --   --  4.6   MG  --   --   --  1.50*   CHLORIDE 101 102  --  107   CO2 23 27  --  25   BUN 10.1 7.1  --  10.6   CREATININE 0.96 0.72  --  0.60   EGFRNORACEVR >60 >60  --  >60   GLUCOSE 114* 125*  --  93   BILITOT 0.6  --   --   --    ALKPHOS 116  --   --   --    ALT 25  --   --   --    AST 27  --   --   --    ALBUMIN 3.2*  --   --  2.6*   WBC 13.35* 8.91 7.88 5.73   HGB 12.8 11.5* 10.0* 10.0*   HCT 38.9 34.5* 29.9* 30.6*     Nutrition Orders:  Diet Adult Regular      Appetite/Oral Intake: fair/50-75% of meals  Factors Affecting Nutritional Intake:  "cultural/Restoration beliefs  Food/Faith/Cultural Preferences: none reported  Food Allergies: none reported  Last Bowel Movement: 04/23/24  Wound(s):  intact    Comments    (4/25) Pt with fair appetite and intake. Cultural background does affect intake. No reports N/V/C/D. No chewing or swallowing difficulties. Able to feed self. CBW: 98# (4/23) Pt recently gave birth 2/24    Anthropometrics    Height: 5' 1" (154.9 cm), Height Method: Stated  Last Weight: 44.5 kg (98 lb) (04/23/24 2109), Weight Method: Bed Scale  BMI (Calculated): 18.5  BMI Classification: normal (BMI 18.5-24.9)     Ideal Body Weight (IBW), Female: 105 lb     % Ideal Body Weight, Female (lb): 93.33 %                             Usual Weight Provided By: patient denies unintentional weight loss    Wt Readings from Last 5 Encounters:   04/23/24 44.5 kg (98 lb)   04/22/24 44.5 kg (98 lb)   04/05/24 45.6 kg (100 lb 9.6 oz)   04/02/24 46.7 kg (103 lb)   03/05/24 47.5 kg (104 lb 12.8 oz)     Weight Change(s) Since Admission:   Wt Readings from Last 1 Encounters:   04/23/24 2109 44.5 kg (98 lb)   04/23/24 1959 44.5 kg (98 lb)   Admit Weight: 44.5 kg (98 lb) (04/23/24 1959), Weight Method: Stated        Nutrition Goals & Monitoring     Dietitian will monitor: energy intake, weight, electrolyte/renal panel, glucose/endocrine profile, and gastrointestinal profile    Nutrition Risk/Follow-Up: low (follow-up in 5-7 days)  Patients assigned 'low nutrition risk' status do not qualify for a full nutritional assessment but will be monitored and re-evaluated in a 5-7 day time period. Please consult if re-evaluation needed sooner.  "

## 2024-04-25 NOTE — PLAN OF CARE
Problem: Adult Inpatient Plan of Care  Goal: Plan of Care Review  Outcome: Progressing  Flowsheets (Taken 4/25/2024 0217)  Plan of Care Reviewed With: patient  Goal: Absence of Hospital-Acquired Illness or Injury  Outcome: Progressing  Intervention: Prevent and Manage VTE (Venous Thromboembolism) Risk  Flowsheets (Taken 4/25/2024 0217)  VTE Prevention/Management:   remove, assess skin, and reapply compression stockings   fluids promoted   ambulation promoted     Problem: Infection  Goal: Absence of Infection Signs and Symptoms  Outcome: Progressing  Intervention: Prevent or Manage Infection  Flowsheets (Taken 4/25/2024 0217)  Fever Reduction/Comfort Measures: lightweight bedding

## 2024-04-26 LAB — BACTERIA UR CULT: NO GROWTH

## 2024-04-26 PROCEDURE — 25000003 PHARM REV CODE 250: Performed by: INTERNAL MEDICINE

## 2024-04-26 PROCEDURE — 63600175 PHARM REV CODE 636 W HCPCS: Performed by: INTERNAL MEDICINE

## 2024-04-26 PROCEDURE — 11000001 HC ACUTE MED/SURG PRIVATE ROOM

## 2024-04-26 RX ADMIN — Medication 400 MG: at 08:04

## 2024-04-26 RX ADMIN — POTASSIUM CHLORIDE 20 MEQ: 1500 TABLET, EXTENDED RELEASE ORAL at 09:04

## 2024-04-26 RX ADMIN — Medication 400 MG: at 09:04

## 2024-04-26 RX ADMIN — CEFTRIAXONE 2 G: 2 INJECTION, POWDER, FOR SOLUTION INTRAMUSCULAR; INTRAVENOUS at 10:04

## 2024-04-26 RX ADMIN — POTASSIUM CHLORIDE 20 MEQ: 1500 TABLET, EXTENDED RELEASE ORAL at 08:04

## 2024-04-26 NOTE — PLAN OF CARE
04/26/24 1525   Discharge Planning   Assessment Type Discharge Planning Brief Assessment   Resource/Environmental Concerns none   Support Systems Family members   Equipment Currently Used at Home none   Current Living Arrangements home   Patient/Family Anticipates Transition to home with family   Patient/Family Anticipated Services at Transition none   DME Needed Upon Discharge  none   Discharge Plan A Home with family   Discharge Plan B Home with family     Admitted w pyelonephritis. Family to asst w recovery. No dme. No hh. No dcp needs identified.

## 2024-04-26 NOTE — PROGRESS NOTES
[facility]  [facility] Medicine Progress Note      Patient Name: Bennie Escamilla  MRN: [id number]  Admission Date: 4/23/2024   Length of Stay: 3  Attending Physician: Geremias Glass MD  Primary Care Provider: Rosario, Primary Doctor  Face-to-Face encounter date: 04/26/2024    Code Status: Full Code        Chief Complaint:   Fever and Dizziness        HPI:   29-year-old female with a recent history of gestational diabetes and recent childbirth 2 months prior who initially presented to ED yesterday with complaints of flank pain and was diagnosed with the acute pyelonephritis and was discharged on oral antibiotics (cefpodoxime) the after receiving Rocephin IV x1 patient was called back to ED today given blood culture results of Gram-negative rods thus far.  Upon arrival to ED she noted improvement to her flank pain have febrile illness with temp of 102°.  Persistent fever and bacteremia patient is being admitted we will continue IV antibiotic administration until final results of blood cultures with no she denies any nausea or vomiting or diarrhea at this time.  Patient is Chinese and only speaks Mandarin so it assistance of a professional  was attempted with given technical difficulties with speech the patient's spouse who was at bedside assisted with translation.     Overview/Hospital Course:  No notes on file       Interval Hx:   The patient has no c/o. No fever, chills or abd pain. All questions answered via translation bernardo.    Review of Systems   All other systems reviewed and are negative.      Objective/physical exam:  General: In no acute distress, afebrile  Chest: Clear to auscultation bilaterally  Heart: RRR, +S1, S2, no appreciable murmur  Abdomen: Soft, nontender, BS +  MSK: Warm, dry  Neurologic: Alert and oriented x4  Psych/mental status: Appropriate mood and affect. Responds appropriately to questions.     VITAL SIGNS: 24 HRS MIN & MAX LAST   Temp  Min: 97.5  °F (36.4 °C)  Max: 98.4 °F (36.9 °C) 98.4 °F (36.9 °C)   BP  Min: 95/60  Max: 116/74 100/67   Pulse  Min: 55  Max: 64  (!) 59   Resp  Min: 18  Max: 20 18   SpO2  Min: 97 %  Max: 99 % 99 %       Recent Labs   Lab 04/23/24 2021 04/24/24 0524 04/25/24  0515   WBC 8.91 7.88 5.73   RBC 3.80* 3.29* 3.37*   HGB 11.5* 10.0* 10.0*   HCT 34.5* 29.9* 30.6*   MCV 90.8 90.9 90.8   MCH 30.3 30.4 29.7   MCHC 33.3 33.4 32.7*   RDW 12.3 12.4 12.1    252 297   MPV 8.7 8.8 9.1       Recent Labs   Lab 04/22/24 2213 04/23/24 2021 04/25/24  0515    138 142   K 3.6 3.2* 3.1*   CO2 23 27 25   BUN 10.1 7.1 10.6   CREATININE 0.96 0.72 0.60   CALCIUM 9.2 8.9 8.8   MG  --   --  1.50*   ALBUMIN 3.2*  --  2.6*   ALKPHOS 116  --   --    ALT 25  --   --    AST 27  --   --    BILITOT 0.6  --   --         Microbiology Results (last 7 days)       Procedure Component Value Units Date/Time    Blood Culture [[id number]]  (Normal) Collected: 04/25/24 0605    Order Status: Completed Specimen: Blood from Antecubital, Right Updated: 04/26/24 1000     CULTURE, BLOOD ([facility]) No Growth At 24 Hours    Blood Culture [[id number]]  (Normal) Collected: 04/25/24 0610    Order Status: Completed Specimen: Blood from Hand, Right Updated: 04/26/24 1000     CULTURE, BLOOD ([facility]) No Growth At 24 Hours    Urine culture High Risk **CANNOT BE ORDERED STAT** [[id number]] Collected: 04/23/24 2050    Order Status: Completed Specimen: Urine, Clean Catch Updated: 04/26/24 0704     Urine Culture No Growth    Blood culture #1 **CANNOT BE ORDERED STAT** [[id number]]  (Normal) Collected: 04/23/24 2201    Order Status: Completed Specimen: Blood from Hand, Left Updated: 04/26/24 0100     CULTURE, BLOOD ([facility]) No Growth At 48 Hours             Radiology:  CT Abdomen Pelvis With IV Contrast NO Oral Contrast  Narrative: EXAMINATION:  CT ABDOMEN PELVIS WITH IV CONTRAST    CLINICAL HISTORY:  Abdominal abscess/infection suspected;    TECHNIQUE:  Low dose axial images, sagittal  and coronal reformations were obtained from the lung bases to the pubic symphysis following the IV administration of 100 mL of Omnipaque 350 no oral contrast was given.    Automatic exposure control (AEC) was utilized for dose reduction.    Dose: 172 mGycm    COMPARISON:  None.    FINDINGS:  There are mild atelectatic changes in the lung bases.  Liver appears normal.  Spleen appears normal.  Pancreas appears normal.  Biliary system appears normal.  The adrenals are not enlarged.  There are several areas of decreased cortical enhancement in the right kidney with a single questionable area in the left kidney this is suggestive of pyelonephritis.  Aorta shows no evidence of an aneurysm.  Uterus appears normal.  Impression: Changes consistent with pyelonephritis of the right kidney in questionable area of pyelonephritis on the left.    [employer] concordance    Electronically signed by: Wang Mayen MD  Date:    04/23/2024  Time:    08:26      Scheduled Med:  Current Facility-Administered Medications   Medication Dose Route Frequency    cefTRIAXone (Rocephin) IV (PEDS and ADULTS)  2 g Intravenous Q24H    magnesium oxide  400 mg Oral BID    potassium chloride  20 mEq Oral BID            Nutrition Status:      Assessment/Plan:    Ecoli Bacteremia  Sepsis  Right Acute pyelonephritis  Bladder prolapse, stage 1  Hypokalemia  Hypomag    Plan  Replacing K and mag  Cont rocephin  4/25 blood cultures neg x 24H  I encouraged patient to walk halls            All diagnosis and differential diagnosis have been reviewed; assessment and plan has been documented; I have personally reviewed the labs and test results that are presently available; I have reviewed the patients medication list; I have reviewed the consulting providers response and recommendations. I have reviewed or attempted to review medical records based upon their availability      _____________________________________________________________________            Geremias ALVAREZ  MD Quan   04/26/2024

## 2024-04-27 LAB
ANION GAP SERPL CALC-SCNC: 8 MEQ/L
BUN SERPL-MCNC: 12.5 MG/DL (ref 7–18.7)
CALCIUM SERPL-MCNC: 9.6 MG/DL (ref 8.4–10.2)
CHLORIDE SERPL-SCNC: 103 MMOL/L (ref 98–107)
CO2 SERPL-SCNC: 29 MMOL/L (ref 22–29)
CREAT SERPL-MCNC: 0.78 MG/DL (ref 0.55–1.02)
CREAT/UREA NIT SERPL: 16
GFR SERPLBLD CREATININE-BSD FMLA CKD-EPI: >60 MLS/MIN/1.73/M2
GLUCOSE SERPL-MCNC: 75 MG/DL (ref 74–100)
MAGNESIUM SERPL-MCNC: 2.1 MG/DL (ref 1.6–2.6)
POTASSIUM SERPL-SCNC: 4.7 MMOL/L (ref 3.5–5.1)
SODIUM SERPL-SCNC: 140 MMOL/L (ref 136–145)

## 2024-04-27 PROCEDURE — 11000001 HC ACUTE MED/SURG PRIVATE ROOM

## 2024-04-27 PROCEDURE — 25000003 PHARM REV CODE 250: Performed by: INTERNAL MEDICINE

## 2024-04-27 PROCEDURE — 80048 BASIC METABOLIC PNL TOTAL CA: CPT | Performed by: INTERNAL MEDICINE

## 2024-04-27 PROCEDURE — 63600175 PHARM REV CODE 636 W HCPCS: Performed by: INTERNAL MEDICINE

## 2024-04-27 PROCEDURE — 83735 ASSAY OF MAGNESIUM: CPT | Performed by: INTERNAL MEDICINE

## 2024-04-27 PROCEDURE — 36415 COLL VENOUS BLD VENIPUNCTURE: CPT | Performed by: INTERNAL MEDICINE

## 2024-04-27 RX ADMIN — CEFTRIAXONE 2 G: 2 INJECTION, POWDER, FOR SOLUTION INTRAMUSCULAR; INTRAVENOUS at 11:04

## 2024-04-27 RX ADMIN — POTASSIUM CHLORIDE 20 MEQ: 1500 TABLET, EXTENDED RELEASE ORAL at 09:04

## 2024-04-27 RX ADMIN — Medication 400 MG: at 08:04

## 2024-04-27 RX ADMIN — Medication 400 MG: at 09:04

## 2024-04-27 NOTE — PROGRESS NOTES
[facility]  [facility] Medicine Progress Note      Patient Name: Bennie Escamilla  MRN: [id number]  Admission Date: 4/23/2024   Length of Stay: 4  Attending Physician: Geremias Glass MD  Primary Care Provider: Rosario, Primary Doctor  Face-to-Face encounter date: 04/27/2024    Code Status: Full Code        Chief Complaint:   Fever and Dizziness        HPI:   29-year-old female with a recent history of gestational diabetes and recent childbirth 2 months prior who initially presented to ED yesterday with complaints of flank pain and was diagnosed with the acute pyelonephritis and was discharged on oral antibiotics (cefpodoxime) the after receiving Rocephin IV x1 patient was called back to ED today given blood culture results of Gram-negative rods thus far.  Upon arrival to ED she noted improvement to her flank pain have febrile illness with temp of 102°.  Persistent fever and bacteremia patient is being admitted we will continue IV antibiotic administration until final results of blood cultures with no she denies any nausea or vomiting or diarrhea at this time.  Patient is Chinese and only speaks Mandarin so it assistance of a professional  was attempted with given technical difficulties with speech the patient's spouse who was at bedside assisted with translation.     Overview/Hospital Course:  No notes on file       Interval Hx:   The patient has no c/o. No fever, chills or abd pain. Her  is at the bedside. All questions answered via translation bernardo.    Review of Systems   All other systems reviewed and are negative.      Objective/physical exam:  General: In no acute distress, afebrile  Chest: Clear to auscultation bilaterally  Heart: RRR, +S1, S2, no appreciable murmur  Abdomen: Soft, nontender, BS +  MSK: Warm, dry  Neurologic: Alert and oriented x4  Psych/mental status: Appropriate mood and affect. Responds appropriately to questions.     VITAL SIGNS: 24 HRS  MIN & MAX LAST   Temp  Min: 97.6 °F (36.4 °C)  Max: 98.2 °F (36.8 °C) 97.6 °F (36.4 °C)   BP  Min: 91/63  Max: 119/76 91/63   Pulse  Min: 56  Max: 62  62   Resp  Min: 18  Max: 18 18   SpO2  Min: 98 %  Max: 98 % 98 %       Recent Labs   Lab 04/23/24 2021 04/24/24 0524 04/25/24 0515   WBC 8.91 7.88 5.73   RBC 3.80* 3.29* 3.37*   HGB 11.5* 10.0* 10.0*   HCT 34.5* 29.9* 30.6*   MCV 90.8 90.9 90.8   MCH 30.3 30.4 29.7   MCHC 33.3 33.4 32.7*   RDW 12.3 12.4 12.1    252 297   MPV 8.7 8.8 9.1       Recent Labs   Lab 04/22/24 2213 04/23/24 2021 04/25/24 0515 04/27/24  1016    138 142 140   K 3.6 3.2* 3.1* 4.7   CO2 23 27 25 29   BUN 10.1 7.1 10.6 12.5   CREATININE 0.96 0.72 0.60 0.78   CALCIUM 9.2 8.9 8.8 9.6   MG  --   --  1.50* 2.10   ALBUMIN 3.2*  --  2.6*  --    ALKPHOS 116  --   --   --    ALT 25  --   --   --    AST 27  --   --   --    BILITOT 0.6  --   --   --         Microbiology Results (last 7 days)       Procedure Component Value Units Date/Time    Blood Culture [[id number]]  (Normal) Collected: 04/25/24 0610    Order Status: Completed Specimen: Blood from Hand, Right Updated: 04/27/24 1000     CULTURE, BLOOD ([facility]) No Growth At 48 Hours    Blood Culture [[id number]]  (Normal) Collected: 04/25/24 0605    Order Status: Completed Specimen: Blood from Antecubital, Right Updated: 04/27/24 1000     CULTURE, BLOOD ([facility]) No Growth At 48 Hours    Blood culture #1 **CANNOT BE ORDERED STAT** [[id number]]  (Normal) Collected: 04/23/24 2201    Order Status: Completed Specimen: Blood from Hand, Left Updated: 04/27/24 0010     CULTURE, BLOOD ([facility]) No Growth At 72 Hours    Urine culture High Risk **CANNOT BE ORDERED STAT** [[id number]] Collected: 04/23/24 2050    Order Status: Completed Specimen: Urine, Clean Catch Updated: 04/26/24 0704     Urine Culture No Growth             Radiology:  CT Abdomen Pelvis With IV Contrast NO Oral Contrast  Narrative: EXAMINATION:  CT ABDOMEN PELVIS WITH IV CONTRAST    CLINICAL  HISTORY:  Abdominal abscess/infection suspected;    TECHNIQUE:  Low dose axial images, sagittal and coronal reformations were obtained from the lung bases to the pubic symphysis following the IV administration of 100 mL of Omnipaque 350 no oral contrast was given.    Automatic exposure control (AEC) was utilized for dose reduction.    Dose: 172 mGycm    COMPARISON:  None.    FINDINGS:  There are mild atelectatic changes in the lung bases.  Liver appears normal.  Spleen appears normal.  Pancreas appears normal.  Biliary system appears normal.  The adrenals are not enlarged.  There are several areas of decreased cortical enhancement in the right kidney with a single questionable area in the left kidney this is suggestive of pyelonephritis.  Aorta shows no evidence of an aneurysm.  Uterus appears normal.  Impression: Changes consistent with pyelonephritis of the right kidney in questionable area of pyelonephritis on the left.    [employer] concordance    Electronically signed by: Wang Mayen MD  Date:    04/23/2024  Time:    08:26      Scheduled Med:  Current Facility-Administered Medications   Medication Dose Route Frequency    cefTRIAXone (Rocephin) IV (PEDS and ADULTS)  2 g Intravenous Q24H    magnesium oxide  400 mg Oral BID    potassium chloride  20 mEq Oral BID            Nutrition Status:      Assessment/Plan:    Ecoli Bacteremia  Sepsis  Right Acute pyelonephritis  Bladder prolapse, stage 1  Hypokalemia -resolved  Hypomag -resolved    Plan  DC K and mag  Cont rocephin  4/25 blood cultures neg x 48H  Plan to DC tomorrow            All diagnosis and differential diagnosis have been reviewed; assessment and plan has been documented; I have personally reviewed the labs and test results that are presently available; I have reviewed the patients medication list; I have reviewed the consulting providers response and recommendations. I have reviewed or attempted to review medical records based upon their  availability      _____________________________________________________________________            [facility] ANTONIO Glass MD   04/27/2024

## 2024-04-28 VITALS
RESPIRATION RATE: 18 BRPM | TEMPERATURE: 98 F | SYSTOLIC BLOOD PRESSURE: 109 MMHG | OXYGEN SATURATION: 99 % | HEIGHT: 61 IN | WEIGHT: 98 LBS | DIASTOLIC BLOOD PRESSURE: 74 MMHG | BODY MASS INDEX: 18.5 KG/M2 | HEART RATE: 68 BPM

## 2024-04-28 PROBLEM — R78.81 BACTEREMIA: Status: RESOLVED | Noted: 2024-04-23 | Resolved: 2024-04-28

## 2024-04-28 PROBLEM — E83.42 HYPOMAGNESEMIA: Status: RESOLVED | Noted: 2024-02-01 | Resolved: 2024-04-28

## 2024-04-28 RX ORDER — CIPROFLOXACIN 500 MG/1
500 TABLET ORAL EVERY 12 HOURS
Qty: 16 TABLET | Refills: 0 | Status: SHIPPED | OUTPATIENT
Start: 2024-04-28 | End: 2024-05-06

## 2024-04-28 NOTE — DISCHARGE SUMMARY
[facility] Hospital Medicine Discharge Summary    Admit Date: 4/23/2024  Discharge Date and Time: 4/28/202411:20 AM  Admitting Physician: [unfilled]  Discharging Physician: Geremias Glass MD.  Primary Care Physician: Rosario, Primary Doctor           Discharge Diagnoses:  Ecoli Bacteremia -resolved  Sepsis -resolved  Right Acute pyelonephritis  Bladder prolapse, stage 1  Hypokalemia -resolved  Hypomag -resolved    HPI  29-year-old female with a recent history of gestational diabetes and recent childbirth 2 months prior who initially presented to ED yesterday with complaints of flank pain and was diagnosed with the acute pyelonephritis and was discharged on oral antibiotics (cefpodoxime) the after receiving Rocephin IV x1 patient was called back to ED today given blood culture results of Gram-negative rods thus far.  Upon arrival to ED she noted improvement to her flank pain have febrile illness with temp of 102°.  Persistent fever and bacteremia patient is being admitted we will continue IV antibiotic administration until final results of blood cultures with no she denies any nausea or vomiting or diarrhea at this time.  Patient is Chinese and only speaks Mandarin so it assistance of a professional  was attempted with given technical difficulties with speech the patient's spouse who was at bedside assisted with translation.       Hospital Course:   The patient's stay was uneventfully. Her fever and wbc normalized. Her repeat blood cultures are neg x 72H. She is stable for dc home to complete 8 more days of Cipro. She is to continue pelvic floor exercises for her prolapse. F/u with GYN as needed.      Pt was seen and examined on the day of discharge.    Vitals:  VITAL SIGNS: 24 HRS MIN & MAX LAST   Temp  Min: 97.5 °F (36.4 °C)  Max: 98 °F (36.7 °C) 97.5 °F (36.4 °C)   BP  Min: 91/63  Max: 104/68 95/62   Pulse  Min: 54  Max: 71  71   Resp  Min: 18  Max: 18 18    SpO2  Min: 97 %  Max: 100 % 100 %       Physical Exam:  General: In no acute distress, afebrile  Chest: Clear to auscultation bilaterally  Heart: RRR, +S1, S2, no appreciable murmur  Abdomen: Soft, nontender, BS +  MSK: Warm, dry  Neurologic: Alert and oriented x4  Psych/mental status: Appropriate mood and affect. Responds appropriately to questions.       Procedures Performed:   No admission procedures for hospital encounter.     Significant Diagnostic Studies: See Full reports for all details    Recent Labs   Lab 04/23/24 2021 04/24/24 0524 04/25/24 0515   WBC 8.91 7.88 5.73   RBC 3.80* 3.29* 3.37*   HGB 11.5* 10.0* 10.0*   HCT 34.5* 29.9* 30.6*   MCV 90.8 90.9 90.8   MCH 30.3 30.4 29.7   MCHC 33.3 33.4 32.7*   RDW 12.3 12.4 12.1    252 297   MPV 8.7 8.8 9.1       Recent Labs   Lab 04/22/24 2213 04/23/24 2021 04/25/24 0515 04/27/24  1016    138 142 140   K 3.6 3.2* 3.1* 4.7   CO2 23 27 25 29   BUN 10.1 7.1 10.6 12.5   CREATININE 0.96 0.72 0.60 0.78   CALCIUM 9.2 8.9 8.8 9.6   MG  --   --  1.50* 2.10   ALBUMIN 3.2*  --  2.6*  --    ALKPHOS 116  --   --   --    ALT 25  --   --   --    AST 27  --   --   --    BILITOT 0.6  --   --   --         Microbiology Results (last 7 days)       Procedure Component Value Units Date/Time    Blood Culture [[id number]]  (Normal) Collected: 04/25/24 0605    Order Status: Completed Specimen: Blood from Antecubital, Right Updated: 04/28/24 1000     CULTURE, BLOOD ([facility]) No Growth At 72 Hours    Blood Culture [[id number]]  (Normal) Collected: 04/25/24 0610    Order Status: Completed Specimen: Blood from Hand, Right Updated: 04/28/24 1000     CULTURE, BLOOD ([facility]) No Growth At 72 Hours    Blood culture #1 **CANNOT BE ORDERED STAT** [[id number]]  (Normal) Collected: 04/23/24 2201    Order Status: Completed Specimen: Blood from Hand, Left Updated: 04/28/24 0100     CULTURE, BLOOD ([facility]) No Growth At 96 Hours    Urine culture High Risk **CANNOT BE ORDERED STAT**  [[id number]] Collected: 04/23/24 2050    Order Status: Completed Specimen: Urine, Clean Catch Updated: 04/26/24 0704     Urine Culture No Growth             CT Abdomen Pelvis With IV Contrast NO Oral Contrast  Narrative: EXAMINATION:  CT ABDOMEN PELVIS WITH IV CONTRAST    CLINICAL HISTORY:  Abdominal abscess/infection suspected;    TECHNIQUE:  Low dose axial images, sagittal and coronal reformations were obtained from the lung bases to the pubic symphysis following the IV administration of 100 mL of Omnipaque 350 no oral contrast was given.    Automatic exposure control (AEC) was utilized for dose reduction.    Dose: 172 mGycm    COMPARISON:  None.    FINDINGS:  There are mild atelectatic changes in the lung bases.  Liver appears normal.  Spleen appears normal.  Pancreas appears normal.  Biliary system appears normal.  The adrenals are not enlarged.  There are several areas of decreased cortical enhancement in the right kidney with a single questionable area in the left kidney this is suggestive of pyelonephritis.  Aorta shows no evidence of an aneurysm.  Uterus appears normal.  Impression: Changes consistent with pyelonephritis of the right kidney in questionable area of pyelonephritis on the left.    [employer] concordance    Electronically signed by: Wang Mayen MD  Date:    04/23/2024  Time:    08:26         Medication List        START taking these medications      ciprofloxacin HCl 500 MG tablet  Commonly known as: CIPRO  Take 1 tablet (500 mg total) by mouth every 12 (twelve) hours. for 8 days            CONTINUE taking these medications      ibuprofen 600 MG tablet  Commonly known as: ADVIL,MOTRIN  Take 1 tablet (600 mg total) by mouth every 6 (six) hours as needed for Pain.     ondansetron 4 MG tablet  Commonly known as: ZOFRAN  Take 1 tablet (4 mg total) by mouth every 6 (six) hours.     PNV,calcium 72-iron-folic acid 27 mg iron- 1 mg Tab  Commonly known as: PRENATAL VITAMIN PLUS LOW IRON  Take 1 tablet  (1 each total) by mouth once daily.            STOP taking these medications      acetaminophen 325 MG tablet  Commonly known as: TYLENOL     cefpodoxime 100 MG tablet  Commonly known as: VANTIN     clotrimazole-betamethasone 1-0.05% cream  Commonly known as: LOTRISONE     triamcinolone acetonide 0.1% 0.1 % ointment  Commonly known as: KENALOG               Where to Get Your Medications        You can get these medications from any pharmacy    Bring a paper prescription for each of these medications  ciprofloxacin HCl 500 MG tablet          Explained in detail to the patient the discharge plan, medications, and follow-up visits. Pt understands and agrees with the treatment plan.  Discharge Disposition: Home or Self Care  Discharged Condition: stable  Diet-   Dietary Orders (From admission, onward)       Start     Ordered    04/25/24 1044  Dietary nutrition supplements Boost Plus Nutritional Drink - Any flavor; TID  Continuous        Comments: Ok to send Ensure Plus   Question Answer Comment   Select PO Supplement: Boost Plus Nutritional Drink - Any flavor    Frequency: TID        04/25/24 1043    04/23/24 2229  Diet Adult Regular  (Diet/Nutrition [facility])  Diet effective now         04/23/24 2228                   Medications Per DC med rec  Activities as tolerated    For further questions contact hospitalist office.    Discharge time >30 minutes    For worsening symptoms, chest pain, shortness of breath, increased abdominal pain, high grade fever, stroke or stroke like symptoms, immediately go to the nearest Emergency Room or call 911 as soon as possible.      Geremias Santoyo M.D, on 4/28/2024. at 11:20 AM.

## 2024-04-29 LAB — BACTERIA BLD CULT: NORMAL

## 2024-04-30 ENCOUNTER — PATIENT MESSAGE (OUTPATIENT)
Dept: ADMINISTRATIVE | Facility: OTHER | Age: 29
End: 2024-04-30
Payer: MEDICAID

## 2024-04-30 LAB
BACTERIA BLD CULT: NORMAL
BACTERIA BLD CULT: NORMAL

## 2024-05-10 ENCOUNTER — LAB VISIT (OUTPATIENT)
Dept: LAB | Facility: HOSPITAL | Age: 29
End: 2024-05-10
Attending: STUDENT IN AN ORGANIZED HEALTH CARE EDUCATION/TRAINING PROGRAM
Payer: COMMERCIAL

## 2024-05-10 ENCOUNTER — OFFICE VISIT (OUTPATIENT)
Dept: FAMILY MEDICINE | Facility: CLINIC | Age: 29
End: 2024-05-10
Payer: COMMERCIAL

## 2024-05-10 ENCOUNTER — PATIENT MESSAGE (OUTPATIENT)
Dept: FAMILY MEDICINE | Facility: CLINIC | Age: 29
End: 2024-05-10

## 2024-05-10 VITALS
OXYGEN SATURATION: 98 % | SYSTOLIC BLOOD PRESSURE: 110 MMHG | DIASTOLIC BLOOD PRESSURE: 76 MMHG | HEIGHT: 61 IN | TEMPERATURE: 98 F | HEART RATE: 64 BPM | WEIGHT: 101.88 LBS | BODY MASS INDEX: 19.23 KG/M2 | RESPIRATION RATE: 16 BRPM

## 2024-05-10 DIAGNOSIS — Z00.00 ANNUAL PHYSICAL EXAM: Primary | ICD-10-CM

## 2024-05-10 DIAGNOSIS — Z13.1 DIABETES MELLITUS SCREENING: ICD-10-CM

## 2024-05-10 DIAGNOSIS — Z13.220 NEED FOR LIPID SCREENING: ICD-10-CM

## 2024-05-10 DIAGNOSIS — N81.10 FEMALE BLADDER PROLAPSE: ICD-10-CM

## 2024-05-10 DIAGNOSIS — Z00.00 ANNUAL PHYSICAL EXAM: ICD-10-CM

## 2024-05-10 DIAGNOSIS — Z75.8 DOES NOT HAVE PRIMARY CARE PROVIDER: ICD-10-CM

## 2024-05-10 PROBLEM — B95.1 POSITIVE GBS TEST: Status: RESOLVED | Noted: 2024-02-01 | Resolved: 2024-05-10

## 2024-05-10 PROBLEM — Z86.32 HISTORY OF GESTATIONAL DIABETES MELLITUS (GDM): Status: RESOLVED | Noted: 2024-01-02 | Resolved: 2024-05-10

## 2024-05-10 PROBLEM — N10 ACUTE PYELONEPHRITIS DUE TO BACTERIA: Status: RESOLVED | Noted: 2024-04-23 | Resolved: 2024-05-10

## 2024-05-10 PROBLEM — B96.89 ACUTE PYELONEPHRITIS DUE TO BACTERIA: Status: RESOLVED | Noted: 2024-04-23 | Resolved: 2024-05-10

## 2024-05-10 PROBLEM — Z78.9 NOT IMMUNE TO RUBELLA: Status: RESOLVED | Noted: 2023-06-27 | Resolved: 2024-05-10

## 2024-05-10 LAB
CHOLEST SERPL-MCNC: 182 MG/DL
CHOLEST/HDLC SERPL: 4 {RATIO} (ref 0–5)
EST. AVERAGE GLUCOSE BLD GHB EST-MCNC: 99.7 MG/DL
HBA1C MFR BLD: 5.1 %
HDLC SERPL-MCNC: 49 MG/DL (ref 35–60)
LDLC SERPL CALC-MCNC: 90 MG/DL (ref 50–140)
TRIGL SERPL-MCNC: 214 MG/DL (ref 37–140)
VLDLC SERPL CALC-MCNC: 43 MG/DL

## 2024-05-10 PROCEDURE — 3074F SYST BP LT 130 MM HG: CPT | Mod: CPTII,,, | Performed by: STUDENT IN AN ORGANIZED HEALTH CARE EDUCATION/TRAINING PROGRAM

## 2024-05-10 PROCEDURE — 3008F BODY MASS INDEX DOCD: CPT | Mod: CPTII,,, | Performed by: STUDENT IN AN ORGANIZED HEALTH CARE EDUCATION/TRAINING PROGRAM

## 2024-05-10 PROCEDURE — 3078F DIAST BP <80 MM HG: CPT | Mod: CPTII,,, | Performed by: STUDENT IN AN ORGANIZED HEALTH CARE EDUCATION/TRAINING PROGRAM

## 2024-05-10 PROCEDURE — 36415 COLL VENOUS BLD VENIPUNCTURE: CPT

## 2024-05-10 PROCEDURE — 80061 LIPID PANEL: CPT

## 2024-05-10 PROCEDURE — 83036 HEMOGLOBIN GLYCOSYLATED A1C: CPT

## 2024-05-10 PROCEDURE — 1159F MED LIST DOCD IN RCRD: CPT | Mod: CPTII,,, | Performed by: STUDENT IN AN ORGANIZED HEALTH CARE EDUCATION/TRAINING PROGRAM

## 2024-05-10 PROCEDURE — 99395 PREV VISIT EST AGE 18-39: CPT | Mod: ,,, | Performed by: STUDENT IN AN ORGANIZED HEALTH CARE EDUCATION/TRAINING PROGRAM

## 2024-05-10 NOTE — PROGRESS NOTES
"Subjective:      Patient ID: Bennie Escamilla is a 29 y.o. Chinese female. She is accompanied by her , Mr. Johnny Underwood.  services provided.    Chief Complaint: Establish Care/Wellness    Preventative Health: Patient amenable to labs. Patient following with OB-GYN at [facility]. Patient states she is concerned for bladder prolapse and would like a referral for further evaluation/management.    FH: She denies any cancers in a first-degree relative.    Review of Systems   Constitutional:  Negative for activity change, appetite change, chills, diaphoresis, fatigue, fever and unexpected weight change.   Eyes:  Negative for visual disturbance.   Respiratory:  Negative for apnea, cough, shortness of breath, wheezing and stridor.    Cardiovascular:  Negative for chest pain, palpitations and leg swelling.   Gastrointestinal:  Negative for abdominal pain, blood in stool, constipation, diarrhea, nausea and vomiting.   Genitourinary:  Negative for difficulty urinating, dysuria, frequency, hematuria and urgency.   Musculoskeletal:  Negative for arthralgias, back pain and myalgias.   Skin:  Negative for rash and wound.   Neurological:  Negative for dizziness, syncope, weakness, numbness and headaches.   Psychiatric/Behavioral:  Negative for behavioral problems, dysphoric mood and sleep disturbance. The patient is not nervous/anxious.      Objective:   /76 (BP Location: Right arm)   Pulse 64   Temp 98.2 °F (36.8 °C) (Temporal)   Resp 16   Ht 5' 1" (1.549 m)   Wt 46.2 kg (101 lb 14.4 oz)   LMP  (LMP Unknown) Comment: 2 months post partum, no menstrual cycle yet  SpO2 98%   Breastfeeding No   BMI 19.25 kg/m²     Physical Exam  Vitals and nursing note reviewed.   Constitutional:       General: She is not in acute distress.     Appearance: Normal appearance. She is not ill-appearing or toxic-appearing.   HENT:      Head: Normocephalic and atraumatic.      Mouth/Throat:      Mouth: Mucous membranes are moist.      " Pharynx: Oropharynx is clear.   Eyes:      Conjunctiva/sclera: Conjunctivae normal.   Cardiovascular:      Rate and Rhythm: Normal rate and regular rhythm.      Heart sounds: Normal heart sounds. No murmur heard.  Pulmonary:      Effort: Pulmonary effort is normal. No respiratory distress.      Breath sounds: Normal breath sounds.   Abdominal:      General: There is no distension.      Palpations: Abdomen is soft.      Tenderness: There is no abdominal tenderness.   Musculoskeletal:         General: No deformity.      Cervical back: Neck supple. No tenderness.      Right lower leg: No edema.      Left lower leg: No edema.   Lymphadenopathy:      Cervical: No cervical adenopathy.   Skin:     General: Skin is warm and dry.      Findings: No lesion or rash.   Neurological:      General: No focal deficit present.      Mental Status: She is alert.      Motor: No weakness.      Coordination: Coordination normal.   Psychiatric:         Mood and Affect: Mood normal.         Behavior: Behavior normal.         Thought Content: Thought content normal.         Judgment: Judgment normal.       Assessment/Plan:   1. Annual physical exam  -     Lipid Panel; Future; Expected date: 05/10/2024  -     Hemoglobin A1C; Future; Expected date: 05/10/2024    2. Need for lipid screening  -     Lipid Panel; Future; Expected date: 05/10/2024    3. Diabetes mellitus screening  -     Hemoglobin A1C; Future; Expected date: 05/10/2024    4. Does not have primary care provider  -     Ambulatory referral/consult to Family Practice    5. Female bladder prolapse  -     Ambulatory referral/consult to Urogynecology; Future; Expected date: 05/17/2024       Follow up in about 1 year (around 5/10/2025) for Wellness.

## 2025-06-12 NOTE — PROGRESS NOTES
I reviewed History, PE, A/P and chart was reviewed.  Services provided in the outpatient department of  a teaching facility, I was immediately available.  I agree with resident, care reasonable and necessary.   Management discussed with resident at time of visit.    Gina Dickinson MD  [facility] - [facility]  [facility]     Detail Level: Generalized Detail Level: Zone Detail Level: Detailed Patient Specific Counseling (Will Not Stick From Patient To Patient): Patient has Effudex at home, advised to use on AKs treated today once they heal from LN2

## 2025-08-26 ENCOUNTER — OFFICE VISIT (OUTPATIENT)
Dept: FAMILY MEDICINE | Facility: CLINIC | Age: 30
End: 2025-08-26
Payer: COMMERCIAL

## 2025-08-26 VITALS
SYSTOLIC BLOOD PRESSURE: 112 MMHG | OXYGEN SATURATION: 99 % | WEIGHT: 101.31 LBS | TEMPERATURE: 99 F | DIASTOLIC BLOOD PRESSURE: 78 MMHG | HEART RATE: 60 BPM | HEIGHT: 61 IN | BODY MASS INDEX: 19.13 KG/M2 | RESPIRATION RATE: 19 BRPM

## 2025-08-26 DIAGNOSIS — Z00.00 WELLNESS EXAMINATION: Primary | ICD-10-CM

## 2025-08-26 PROBLEM — N81.10 FEMALE BLADDER PROLAPSE: Status: RESOLVED | Noted: 2024-05-10 | Resolved: 2025-08-26

## 2025-08-26 PROCEDURE — 3074F SYST BP LT 130 MM HG: CPT | Mod: CPTII,,, | Performed by: NURSE PRACTITIONER

## 2025-08-26 PROCEDURE — 3078F DIAST BP <80 MM HG: CPT | Mod: CPTII,,, | Performed by: NURSE PRACTITIONER

## 2025-08-26 PROCEDURE — 1160F RVW MEDS BY RX/DR IN RCRD: CPT | Mod: CPTII,,, | Performed by: NURSE PRACTITIONER

## 2025-08-26 PROCEDURE — 3008F BODY MASS INDEX DOCD: CPT | Mod: CPTII,,, | Performed by: NURSE PRACTITIONER

## 2025-08-26 PROCEDURE — 99395 PREV VISIT EST AGE 18-39: CPT | Mod: ,,, | Performed by: NURSE PRACTITIONER

## 2025-08-26 PROCEDURE — 1159F MED LIST DOCD IN RCRD: CPT | Mod: CPTII,,, | Performed by: NURSE PRACTITIONER

## 2025-08-29 ENCOUNTER — LAB VISIT (OUTPATIENT)
Dept: LAB | Facility: HOSPITAL | Age: 30
End: 2025-08-29
Attending: NURSE PRACTITIONER
Payer: COMMERCIAL

## 2025-08-29 DIAGNOSIS — R82.71 BACTERIURIA: ICD-10-CM

## 2025-08-29 DIAGNOSIS — Z00.00 WELLNESS EXAMINATION: ICD-10-CM

## 2025-08-29 LAB
ALBUMIN SERPL-MCNC: 4 G/DL (ref 3.5–5)
ALBUMIN/GLOB SERPL: 1 RATIO (ref 1.1–2)
ALP SERPL-CCNC: 53 UNIT/L (ref 40–150)
ALT SERPL-CCNC: 12 UNIT/L (ref 0–55)
ANION GAP SERPL CALC-SCNC: 8 MEQ/L
AST SERPL-CCNC: 15 UNIT/L (ref 11–45)
BACTERIA #/AREA URNS AUTO: ABNORMAL /HPF
BASOPHILS # BLD AUTO: 0.05 X10(3)/MCL
BASOPHILS NFR BLD AUTO: 0.7 %
BILIRUB SERPL-MCNC: 0.9 MG/DL
BILIRUB UR QL STRIP.AUTO: NEGATIVE
BUN SERPL-MCNC: 10.6 MG/DL (ref 7–18.7)
CALCIUM SERPL-MCNC: 9 MG/DL (ref 8.4–10.2)
CHLORIDE SERPL-SCNC: 107 MMOL/L (ref 98–107)
CHOLEST SERPL-MCNC: 143 MG/DL
CHOLEST/HDLC SERPL: 3 {RATIO} (ref 0–5)
CLARITY UR: ABNORMAL
CO2 SERPL-SCNC: 26 MMOL/L (ref 22–29)
COLOR UR AUTO: ABNORMAL
CREAT SERPL-MCNC: 0.63 MG/DL (ref 0.55–1.02)
CREAT/UREA NIT SERPL: 17
EOSINOPHIL # BLD AUTO: 0.13 X10(3)/MCL (ref 0–0.9)
EOSINOPHIL NFR BLD AUTO: 1.9 %
ERYTHROCYTE [DISTWIDTH] IN BLOOD BY AUTOMATED COUNT: 11.9 % (ref 11.5–17)
EST. AVERAGE GLUCOSE BLD GHB EST-MCNC: 105.4 MG/DL
GFR SERPLBLD CREATININE-BSD FMLA CKD-EPI: >60 ML/MIN/1.73/M2
GLOBULIN SER-MCNC: 4.2 GM/DL (ref 2.4–3.5)
GLUCOSE SERPL-MCNC: 94 MG/DL (ref 74–100)
GLUCOSE UR QL STRIP: NEGATIVE
HBA1C MFR BLD: 5.3 %
HCT VFR BLD AUTO: 40 % (ref 37–47)
HDLC SERPL-MCNC: 42 MG/DL (ref 35–60)
HGB BLD-MCNC: 13.4 G/DL (ref 12–16)
HGB UR QL STRIP: NEGATIVE
IMM GRANULOCYTES # BLD AUTO: 0.01 X10(3)/MCL (ref 0–0.04)
IMM GRANULOCYTES NFR BLD AUTO: 0.1 %
KETONES UR QL STRIP: NEGATIVE
LDLC SERPL CALC-MCNC: 72 MG/DL (ref 50–140)
LEUKOCYTE ESTERASE UR QL STRIP: ABNORMAL
LYMPHOCYTES # BLD AUTO: 3.26 X10(3)/MCL (ref 0.6–4.6)
LYMPHOCYTES NFR BLD AUTO: 46.9 %
MCH RBC QN AUTO: 30.2 PG (ref 27–31)
MCHC RBC AUTO-ENTMCNC: 33.5 G/DL (ref 33–36)
MCV RBC AUTO: 90.1 FL (ref 80–94)
MONOCYTES # BLD AUTO: 0.31 X10(3)/MCL (ref 0.1–1.3)
MONOCYTES NFR BLD AUTO: 4.5 %
NEUTROPHILS # BLD AUTO: 3.19 X10(3)/MCL (ref 2.1–9.2)
NEUTROPHILS NFR BLD AUTO: 45.9 %
NITRITE UR QL STRIP: NEGATIVE
NRBC BLD AUTO-RTO: 0 %
PH UR STRIP: 6.5 [PH]
PLATELET # BLD AUTO: 226 X10(3)/MCL (ref 130–400)
PMV BLD AUTO: 9.8 FL (ref 7.4–10.4)
POTASSIUM SERPL-SCNC: 3.5 MMOL/L (ref 3.5–5.1)
PROT SERPL-MCNC: 8.2 GM/DL (ref 6.4–8.3)
PROT UR QL STRIP: NEGATIVE
RBC # BLD AUTO: 4.44 X10(6)/MCL (ref 4.2–5.4)
RBC #/AREA URNS AUTO: ABNORMAL /HPF
SODIUM SERPL-SCNC: 141 MMOL/L (ref 136–145)
SP GR UR STRIP.AUTO: 1.01 (ref 1–1.03)
SQUAMOUS #/AREA URNS AUTO: ABNORMAL /HPF
TRIGL SERPL-MCNC: 146 MG/DL (ref 37–140)
TSH SERPL-ACNC: 2.23 UIU/ML (ref 0.35–4.94)
UROBILINOGEN UR STRIP-ACNC: 0.2
VLDLC SERPL CALC-MCNC: 29 MG/DL
WBC # BLD AUTO: 6.95 X10(3)/MCL (ref 4.5–11.5)
WBC #/AREA URNS AUTO: ABNORMAL /HPF

## 2025-08-29 PROCEDURE — 80053 COMPREHEN METABOLIC PANEL: CPT

## 2025-08-29 PROCEDURE — 85025 COMPLETE CBC W/AUTO DIFF WBC: CPT

## 2025-08-29 PROCEDURE — 36415 COLL VENOUS BLD VENIPUNCTURE: CPT

## 2025-08-29 PROCEDURE — 81003 URINALYSIS AUTO W/O SCOPE: CPT

## 2025-08-29 PROCEDURE — 80061 LIPID PANEL: CPT

## 2025-08-29 PROCEDURE — 87086 URINE CULTURE/COLONY COUNT: CPT

## 2025-08-29 PROCEDURE — 83036 HEMOGLOBIN GLYCOSYLATED A1C: CPT

## 2025-08-29 PROCEDURE — 84443 ASSAY THYROID STIM HORMONE: CPT

## 2025-08-31 LAB — BACTERIA UR CULT: ABNORMAL
